# Patient Record
Sex: MALE | Race: WHITE | NOT HISPANIC OR LATINO | Employment: OTHER | URBAN - METROPOLITAN AREA
[De-identification: names, ages, dates, MRNs, and addresses within clinical notes are randomized per-mention and may not be internally consistent; named-entity substitution may affect disease eponyms.]

---

## 2018-04-15 ENCOUNTER — HOSPITAL ENCOUNTER (INPATIENT)
Facility: HOSPITAL | Age: 73
LOS: 2 days | Discharge: HOME | End: 2018-04-17
Attending: INTERNAL MEDICINE | Admitting: INTERNAL MEDICINE

## 2018-04-15 ENCOUNTER — APPOINTMENT (OUTPATIENT)
Dept: GENERAL RADIOLOGY | Facility: HOSPITAL | Age: 73
End: 2018-04-15

## 2018-04-15 DIAGNOSIS — Z74.09 IMPAIRED FUNCTIONAL MOBILITY, BALANCE, GAIT, AND ENDURANCE: Primary | ICD-10-CM

## 2018-04-15 PROBLEM — E87.1 HYPONATREMIA: Status: ACTIVE | Noted: 2018-04-15

## 2018-04-15 PROBLEM — I10 HTN (HYPERTENSION): Chronic | Status: ACTIVE | Noted: 2018-04-15

## 2018-04-15 PROBLEM — C61 PROSTATE CANCER: Chronic | Status: ACTIVE | Noted: 2018-04-15

## 2018-04-15 PROBLEM — R73.9 HYPERGLYCEMIA: Status: ACTIVE | Noted: 2018-04-15

## 2018-04-15 PROBLEM — C49.9 SARCOMA (HCC): Status: ACTIVE | Noted: 2018-04-15

## 2018-04-15 PROBLEM — A41.9 SEPSIS (HCC): Status: ACTIVE | Noted: 2018-04-15

## 2018-04-15 PROBLEM — I25.10 CAD (CORONARY ARTERY DISEASE): Chronic | Status: ACTIVE | Noted: 2018-04-15

## 2018-04-15 PROBLEM — E87.6 HYPOKALEMIA: Status: ACTIVE | Noted: 2018-04-15

## 2018-04-15 LAB
ALBUMIN SERPL-MCNC: 3.4 G/DL (ref 3.2–4.8)
ALBUMIN/GLOB SERPL: 1.4 G/DL (ref 1.5–2.5)
ALP SERPL-CCNC: 83 U/L (ref 25–100)
ALT SERPL W P-5'-P-CCNC: 11 U/L (ref 7–40)
ANION GAP SERPL CALCULATED.3IONS-SCNC: 7 MMOL/L (ref 3–11)
APTT PPP: 30.9 SECONDS (ref 24–31)
AST SERPL-CCNC: 11 U/L (ref 0–33)
BASOPHILS # BLD AUTO: 0.02 10*3/MM3 (ref 0–0.2)
BASOPHILS NFR BLD AUTO: 0.2 % (ref 0–1)
BILIRUB SERPL-MCNC: 0.8 MG/DL (ref 0.3–1.2)
BNP SERPL-MCNC: 83 PG/ML (ref 0–100)
BUN BLD-MCNC: 17 MG/DL (ref 9–23)
BUN/CREAT SERPL: 21.3 (ref 7–25)
CA-I SERPL ISE-MCNC: 1.2 MMOL/L (ref 1.12–1.32)
CALCIUM SPEC-SCNC: 8.7 MG/DL (ref 8.7–10.4)
CHLORIDE SERPL-SCNC: 98 MMOL/L (ref 99–109)
CO2 SERPL-SCNC: 25 MMOL/L (ref 20–31)
CREAT BLD-MCNC: 0.8 MG/DL (ref 0.6–1.3)
CRP SERPL-MCNC: 22.44 MG/DL (ref 0–1)
D-LACTATE SERPL-SCNC: 1.5 MMOL/L (ref 0.5–2)
DEPRECATED RDW RBC AUTO: 47.4 FL (ref 37–54)
EOSINOPHIL # BLD AUTO: 0.04 10*3/MM3 (ref 0–0.3)
EOSINOPHIL NFR BLD AUTO: 0.3 % (ref 0–3)
ERYTHROCYTE [DISTWIDTH] IN BLOOD BY AUTOMATED COUNT: 13.5 % (ref 11.3–14.5)
ERYTHROCYTE [SEDIMENTATION RATE] IN BLOOD: 64 MM/HR (ref 0–20)
GFR SERPL CREATININE-BSD FRML MDRD: 95 ML/MIN/1.73
GLOBULIN UR ELPH-MCNC: 2.4 GM/DL
GLUCOSE BLD-MCNC: 133 MG/DL (ref 70–100)
GLUCOSE BLDC GLUCOMTR-MCNC: 104 MG/DL (ref 70–130)
GLUCOSE BLDC GLUCOMTR-MCNC: 123 MG/DL (ref 70–130)
GLUCOSE BLDC GLUCOMTR-MCNC: 96 MG/DL (ref 70–130)
GLUCOSE BLDC GLUCOMTR-MCNC: 98 MG/DL (ref 70–130)
HBA1C MFR BLD: 6.6 % (ref 4.8–5.6)
HCT VFR BLD AUTO: 34 % (ref 38.9–50.9)
HGB BLD-MCNC: 11.1 G/DL (ref 13.1–17.5)
IMM GRANULOCYTES # BLD: 0.04 10*3/MM3 (ref 0–0.03)
IMM GRANULOCYTES NFR BLD: 0.3 % (ref 0–0.6)
INR PPP: 1 (ref 0.91–1.09)
LYMPHOCYTES # BLD AUTO: 0.68 10*3/MM3 (ref 0.6–4.8)
LYMPHOCYTES NFR BLD AUTO: 5.4 % (ref 24–44)
MAGNESIUM SERPL-MCNC: 1.9 MG/DL (ref 1.3–2.7)
MCH RBC QN AUTO: 31 PG (ref 27–31)
MCHC RBC AUTO-ENTMCNC: 32.6 G/DL (ref 32–36)
MCV RBC AUTO: 95 FL (ref 80–99)
MONOCYTES # BLD AUTO: 0.51 10*3/MM3 (ref 0–1)
MONOCYTES NFR BLD AUTO: 4.1 % (ref 0–12)
NEUTROPHILS # BLD AUTO: 11.29 10*3/MM3 (ref 1.5–8.3)
NEUTROPHILS NFR BLD AUTO: 89.7 % (ref 41–71)
PLAT MORPH BLD: NORMAL
PLATELET # BLD AUTO: 215 10*3/MM3 (ref 150–450)
PMV BLD AUTO: 11.9 FL (ref 6–12)
POTASSIUM BLD-SCNC: 3.1 MMOL/L (ref 3.5–5.5)
PROCALCITONIN SERPL-MCNC: 0.75 NG/ML
PROT SERPL-MCNC: 5.8 G/DL (ref 5.7–8.2)
PROTHROMBIN TIME: 10.5 SECONDS (ref 9.6–11.5)
RBC # BLD AUTO: 3.58 10*6/MM3 (ref 4.2–5.76)
RBC MORPH BLD: NORMAL
SODIUM BLD-SCNC: 130 MMOL/L (ref 132–146)
TSH SERPL DL<=0.05 MIU/L-ACNC: 2.08 MIU/ML (ref 0.35–5.35)
WBC MORPH BLD: NORMAL
WBC NRBC COR # BLD: 12.58 10*3/MM3 (ref 3.5–10.8)

## 2018-04-15 PROCEDURE — 85652 RBC SED RATE AUTOMATED: CPT | Performed by: NURSE PRACTITIONER

## 2018-04-15 PROCEDURE — 25010000002 HEPARIN (PORCINE) PER 1000 UNITS: Performed by: NURSE PRACTITIONER

## 2018-04-15 PROCEDURE — 87040 BLOOD CULTURE FOR BACTERIA: CPT | Performed by: NURSE PRACTITIONER

## 2018-04-15 PROCEDURE — 25810000003 SODIUM CHLORIDE 0.9 % WITH KCL 20 MEQ 20-0.9 MEQ/L-% SOLUTION: Performed by: NURSE PRACTITIONER

## 2018-04-15 PROCEDURE — 87186 SC STD MICRODIL/AGAR DIL: CPT | Performed by: INTERNAL MEDICINE

## 2018-04-15 PROCEDURE — 99223 1ST HOSP IP/OBS HIGH 75: CPT | Performed by: INTERNAL MEDICINE

## 2018-04-15 PROCEDURE — 71045 X-RAY EXAM CHEST 1 VIEW: CPT

## 2018-04-15 PROCEDURE — 25010000002 VANCOMYCIN PER 500 MG

## 2018-04-15 PROCEDURE — 85730 THROMBOPLASTIN TIME PARTIAL: CPT | Performed by: NURSE PRACTITIONER

## 2018-04-15 PROCEDURE — 87077 CULTURE AEROBIC IDENTIFY: CPT | Performed by: INTERNAL MEDICINE

## 2018-04-15 PROCEDURE — 84443 ASSAY THYROID STIM HORMONE: CPT | Performed by: NURSE PRACTITIONER

## 2018-04-15 PROCEDURE — 82962 GLUCOSE BLOOD TEST: CPT

## 2018-04-15 PROCEDURE — 83735 ASSAY OF MAGNESIUM: CPT | Performed by: NURSE PRACTITIONER

## 2018-04-15 PROCEDURE — 87205 SMEAR GRAM STAIN: CPT | Performed by: INTERNAL MEDICINE

## 2018-04-15 PROCEDURE — 83880 ASSAY OF NATRIURETIC PEPTIDE: CPT | Performed by: NURSE PRACTITIONER

## 2018-04-15 PROCEDURE — 87147 CULTURE TYPE IMMUNOLOGIC: CPT | Performed by: INTERNAL MEDICINE

## 2018-04-15 PROCEDURE — 80053 COMPREHEN METABOLIC PANEL: CPT | Performed by: NURSE PRACTITIONER

## 2018-04-15 PROCEDURE — 83036 HEMOGLOBIN GLYCOSYLATED A1C: CPT | Performed by: NURSE PRACTITIONER

## 2018-04-15 PROCEDURE — 85007 BL SMEAR W/DIFF WBC COUNT: CPT | Performed by: NURSE PRACTITIONER

## 2018-04-15 PROCEDURE — 85610 PROTHROMBIN TIME: CPT | Performed by: NURSE PRACTITIONER

## 2018-04-15 PROCEDURE — 86140 C-REACTIVE PROTEIN: CPT | Performed by: NURSE PRACTITIONER

## 2018-04-15 PROCEDURE — 85025 COMPLETE CBC W/AUTO DIFF WBC: CPT | Performed by: NURSE PRACTITIONER

## 2018-04-15 PROCEDURE — 25010000002 CEFTRIAXONE PER 250 MG: Performed by: PHYSICIAN ASSISTANT

## 2018-04-15 PROCEDURE — 63710000001 INSULIN LISPRO (HUMAN) PER 5 UNITS: Performed by: NURSE PRACTITIONER

## 2018-04-15 PROCEDURE — 97161 PT EVAL LOW COMPLEX 20 MIN: CPT

## 2018-04-15 PROCEDURE — 83605 ASSAY OF LACTIC ACID: CPT | Performed by: NURSE PRACTITIONER

## 2018-04-15 PROCEDURE — 87070 CULTURE OTHR SPECIMN AEROBIC: CPT | Performed by: INTERNAL MEDICINE

## 2018-04-15 PROCEDURE — 25010000002 VANCOMYCIN

## 2018-04-15 PROCEDURE — 82330 ASSAY OF CALCIUM: CPT | Performed by: NURSE PRACTITIONER

## 2018-04-15 PROCEDURE — 84145 PROCALCITONIN (PCT): CPT | Performed by: NURSE PRACTITIONER

## 2018-04-15 RX ORDER — ASPIRIN 81 MG/1
81 TABLET ORAL DAILY
Status: DISCONTINUED | OUTPATIENT
Start: 2018-04-15 | End: 2018-04-17 | Stop reason: HOSPADM

## 2018-04-15 RX ORDER — MAGNESIUM SULFATE HEPTAHYDRATE 40 MG/ML
2 INJECTION, SOLUTION INTRAVENOUS AS NEEDED
Status: DISCONTINUED | OUTPATIENT
Start: 2018-04-15 | End: 2018-04-17 | Stop reason: HOSPADM

## 2018-04-15 RX ORDER — HYDROCODONE BITARTRATE AND ACETAMINOPHEN 7.5; 325 MG/1; MG/1
1 TABLET ORAL EVERY 6 HOURS PRN
Status: DISCONTINUED | OUTPATIENT
Start: 2018-04-15 | End: 2018-04-17 | Stop reason: HOSPADM

## 2018-04-15 RX ORDER — DOCUSATE SODIUM 100 MG/1
100 CAPSULE, LIQUID FILLED ORAL 2 TIMES DAILY PRN
Status: DISCONTINUED | OUTPATIENT
Start: 2018-04-15 | End: 2018-04-17 | Stop reason: HOSPADM

## 2018-04-15 RX ORDER — PROMETHAZINE HYDROCHLORIDE 25 MG/ML
12.5 INJECTION, SOLUTION INTRAMUSCULAR; INTRAVENOUS EVERY 6 HOURS PRN
Status: DISCONTINUED | OUTPATIENT
Start: 2018-04-15 | End: 2018-04-17 | Stop reason: HOSPADM

## 2018-04-15 RX ORDER — POTASSIUM CHLORIDE 1.5 G/1.77G
40 POWDER, FOR SOLUTION ORAL AS NEEDED
Status: DISCONTINUED | OUTPATIENT
Start: 2018-04-15 | End: 2018-04-17 | Stop reason: HOSPADM

## 2018-04-15 RX ORDER — FLUTICASONE PROPIONATE 50 MCG
2 SPRAY, SUSPENSION (ML) NASAL DAILY
COMMUNITY

## 2018-04-15 RX ORDER — FAMOTIDINE 20 MG/1
20 TABLET, FILM COATED ORAL 2 TIMES DAILY PRN
Status: DISCONTINUED | OUTPATIENT
Start: 2018-04-15 | End: 2018-04-17 | Stop reason: HOSPADM

## 2018-04-15 RX ORDER — MAGNESIUM SULFATE HEPTAHYDRATE 40 MG/ML
4 INJECTION, SOLUTION INTRAVENOUS AS NEEDED
Status: DISCONTINUED | OUTPATIENT
Start: 2018-04-15 | End: 2018-04-17 | Stop reason: HOSPADM

## 2018-04-15 RX ORDER — HEPARIN SODIUM 5000 [USP'U]/ML
5000 INJECTION, SOLUTION INTRAVENOUS; SUBCUTANEOUS EVERY 12 HOURS SCHEDULED
Status: DISCONTINUED | OUTPATIENT
Start: 2018-04-15 | End: 2018-04-17 | Stop reason: HOSPADM

## 2018-04-15 RX ORDER — L.ACID,PARA/B.BIFIDUM/S.THERM 8B CELL
1 CAPSULE ORAL DAILY
Status: DISCONTINUED | OUTPATIENT
Start: 2018-04-15 | End: 2018-04-17 | Stop reason: HOSPADM

## 2018-04-15 RX ORDER — SODIUM CHLORIDE 0.9 % (FLUSH) 0.9 %
1-10 SYRINGE (ML) INJECTION AS NEEDED
Status: DISCONTINUED | OUTPATIENT
Start: 2018-04-15 | End: 2018-04-17 | Stop reason: HOSPADM

## 2018-04-15 RX ORDER — ABIRATERONE ACETATE 250 MG/1
1000 TABLET ORAL NIGHTLY
COMMUNITY
End: 2020-01-29

## 2018-04-15 RX ORDER — ATORVASTATIN CALCIUM 20 MG/1
20 TABLET, FILM COATED ORAL NIGHTLY
Status: DISCONTINUED | OUTPATIENT
Start: 2018-04-15 | End: 2018-04-17 | Stop reason: HOSPADM

## 2018-04-15 RX ORDER — PREDNISONE 1 MG/1
5 TABLET ORAL 2 TIMES DAILY
COMMUNITY
End: 2020-01-29

## 2018-04-15 RX ORDER — CEFTRIAXONE SODIUM 1 G/50ML
1 INJECTION, SOLUTION INTRAVENOUS EVERY 24 HOURS
Status: DISCONTINUED | OUTPATIENT
Start: 2018-04-15 | End: 2018-04-16

## 2018-04-15 RX ORDER — ONDANSETRON 4 MG/1
4 TABLET, FILM COATED ORAL EVERY 6 HOURS PRN
Status: DISCONTINUED | OUTPATIENT
Start: 2018-04-15 | End: 2018-04-17 | Stop reason: HOSPADM

## 2018-04-15 RX ORDER — POTASSIUM CHLORIDE 750 MG/1
40 CAPSULE, EXTENDED RELEASE ORAL AS NEEDED
Status: DISCONTINUED | OUTPATIENT
Start: 2018-04-15 | End: 2018-04-17 | Stop reason: HOSPADM

## 2018-04-15 RX ORDER — DEXTROSE MONOHYDRATE 25 G/50ML
25 INJECTION, SOLUTION INTRAVENOUS
Status: DISCONTINUED | OUTPATIENT
Start: 2018-04-15 | End: 2018-04-16

## 2018-04-15 RX ORDER — ASPIRIN 81 MG/1
81 TABLET ORAL EVERY MORNING
COMMUNITY

## 2018-04-15 RX ORDER — ONDANSETRON 2 MG/ML
4 INJECTION INTRAMUSCULAR; INTRAVENOUS EVERY 6 HOURS PRN
Status: DISCONTINUED | OUTPATIENT
Start: 2018-04-15 | End: 2018-04-17 | Stop reason: HOSPADM

## 2018-04-15 RX ORDER — FLUTICASONE PROPIONATE 50 MCG
2 SPRAY, SUSPENSION (ML) NASAL DAILY
Status: DISCONTINUED | OUTPATIENT
Start: 2018-04-15 | End: 2018-04-17 | Stop reason: HOSPADM

## 2018-04-15 RX ORDER — ACETAMINOPHEN 325 MG/1
650 TABLET ORAL EVERY 4 HOURS PRN
Status: DISCONTINUED | OUTPATIENT
Start: 2018-04-15 | End: 2018-04-17 | Stop reason: HOSPADM

## 2018-04-15 RX ORDER — NICOTINE POLACRILEX 4 MG
15 LOZENGE BUCCAL
Status: DISCONTINUED | OUTPATIENT
Start: 2018-04-15 | End: 2018-04-16

## 2018-04-15 RX ORDER — TIMOLOL MALEATE 5 MG/ML
1 SOLUTION/ DROPS OPHTHALMIC DAILY
Status: DISCONTINUED | OUTPATIENT
Start: 2018-04-15 | End: 2018-04-17 | Stop reason: HOSPADM

## 2018-04-15 RX ORDER — TIMOLOL 5 MG/ML
SOLUTION/ DROPS OPHTHALMIC EVERY MORNING
COMMUNITY
End: 2018-05-02 | Stop reason: SDUPTHER

## 2018-04-15 RX ORDER — SODIUM CHLORIDE AND POTASSIUM CHLORIDE 150; 900 MG/100ML; MG/100ML
75 INJECTION, SOLUTION INTRAVENOUS CONTINUOUS
Status: DISCONTINUED | OUTPATIENT
Start: 2018-04-15 | End: 2018-04-16

## 2018-04-15 RX ORDER — SIMVASTATIN 40 MG
40 TABLET ORAL NIGHTLY
COMMUNITY

## 2018-04-15 RX ORDER — LANSOPRAZOLE 30 MG/1
30 CAPSULE, DELAYED RELEASE ORAL EVERY MORNING
COMMUNITY

## 2018-04-15 RX ORDER — VANCOMYCIN 1.75 GRAM/500 ML IN 0.9 % SODIUM CHLORIDE INTRAVENOUS
20 EVERY 12 HOURS
Status: DISCONTINUED | OUTPATIENT
Start: 2018-04-15 | End: 2018-04-15 | Stop reason: ALTCHOICE

## 2018-04-15 RX ORDER — BISACODYL 5 MG/1
5 TABLET, DELAYED RELEASE ORAL DAILY PRN
Status: DISCONTINUED | OUTPATIENT
Start: 2018-04-15 | End: 2018-04-17 | Stop reason: HOSPADM

## 2018-04-15 RX ORDER — PANTOPRAZOLE SODIUM 40 MG/1
40 TABLET, DELAYED RELEASE ORAL
Status: DISCONTINUED | OUTPATIENT
Start: 2018-04-15 | End: 2018-04-17 | Stop reason: HOSPADM

## 2018-04-15 RX ADMIN — FLUTICASONE PROPIONATE 2 SPRAY: 50 SPRAY, METERED NASAL at 08:57

## 2018-04-15 RX ADMIN — VANCOMYCIN HYDROCHLORIDE 1250 MG: 10 INJECTION, POWDER, LYOPHILIZED, FOR SOLUTION INTRAVENOUS at 17:12

## 2018-04-15 RX ADMIN — ATORVASTATIN CALCIUM 20 MG: 20 TABLET, FILM COATED ORAL at 20:44

## 2018-04-15 RX ADMIN — AZTREONAM 1 G: 1 INJECTION, POWDER, LYOPHILIZED, FOR SOLUTION INTRAMUSCULAR; INTRAVENOUS at 06:40

## 2018-04-15 RX ADMIN — SODIUM CHLORIDE 1000 ML: 9 INJECTION, SOLUTION INTRAVENOUS at 04:51

## 2018-04-15 RX ADMIN — VANCOMYCIN HYDROCHLORIDE 750 MG: 750 INJECTION, SOLUTION INTRAVENOUS at 06:00

## 2018-04-15 RX ADMIN — Medication 1 CAPSULE: at 08:55

## 2018-04-15 RX ADMIN — ASPIRIN 81 MG: 81 TABLET, COATED ORAL at 08:55

## 2018-04-15 RX ADMIN — HEPARIN SODIUM 5000 UNITS: 5000 INJECTION, SOLUTION INTRAVENOUS; SUBCUTANEOUS at 08:55

## 2018-04-15 RX ADMIN — MUPIROCIN: 20 OINTMENT TOPICAL at 20:44

## 2018-04-15 RX ADMIN — CEFTRIAXONE SODIUM 1 G: 1 INJECTION, SOLUTION INTRAVENOUS at 14:14

## 2018-04-15 RX ADMIN — HEPARIN SODIUM 5000 UNITS: 5000 INJECTION, SOLUTION INTRAVENOUS; SUBCUTANEOUS at 20:44

## 2018-04-15 RX ADMIN — TIMOLOL MALEATE 1 DROP: 5 SOLUTION/ DROPS OPHTHALMIC at 08:55

## 2018-04-15 RX ADMIN — POTASSIUM CHLORIDE AND SODIUM CHLORIDE 75 ML/HR: 900; 150 INJECTION, SOLUTION INTRAVENOUS at 05:59

## 2018-04-15 RX ADMIN — MUPIROCIN: 20 OINTMENT TOPICAL at 17:12

## 2018-04-15 NOTE — PLAN OF CARE
Problem: Fall Risk (Adult)  Intervention: Monitor/Assist with Self Care   04/15/18 0313   Activity   Activity Assistance Provided assistance, 1 person     Intervention: Reduce Risk/Promote Restraint Free Environment   04/15/18 0313   Safety Management   Environmental Safety Modification assistive device/personal items within reach;clutter free environment maintained;lighting adjusted;room organization consistent   Safety Promotion/Fall Prevention activity supervised;nonskid shoes/slippers when out of bed;safety round/check completed       Goal: Identify Related Risk Factors and Signs and Symptoms  Outcome: Ongoing (interventions implemented as appropriate)   04/15/18 0342   Fall Risk (Adult)   Related Risk Factors (Fall Risk) age-related changes;gait/mobility problems;impaired vision     Goal: Absence of Fall   04/15/18 0342   Fall Risk (Adult)   Absence of Fall making progress toward outcome

## 2018-04-15 NOTE — H&P
[facility] Medicine Services  HISTORY AND PHYSICAL    Patient Name: Dusty Manzanares  : 1945  MRN: [id number]  Primary Care Physician: Shalom Holt MD   Dermatology: Dr. Khoi Hameed  Oncology: Dr. Robbin Gavin  Urology: Dr. Karthik Davila  Cardiology: Dr. Fregoso    Subjective   Subjective     Chief Complaint:  Fever     HPI:  Dusty Manzanares is a 73 y.o. male who presented to [facility] for a collection of complaints to include fever. Onset 18 just before arrival to ED, and factor that led them to seek more care. Constant. Moderate. Associated with weakness, N/V, weakness, and decreased PO that started .  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 Hameed at outpatient [facility]. He is also c/o right facial swelling and erythema. Notable labs included WBC 13.3, potassium 2.8, sodium 129, and lactic acid 3.3. He has been started on aztreonam, vanc, potassium, tylenol, zofran, and has received 2L bolus NS PTA for sepsis. He has been transferred to [facility] for higher level of care.    He has a pending appt with radiation oncology Dr. 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    Review of Systems   Constitutional: Positive for activity change, chills and fever. Negative for appetite change and diaphoresis.        Does not know if has lost any wt with cancer, ~175 lbs baseline. Baseline no DME, now can barely walk and stumbling. T >101 PTA to OSH.   HENT: Positive for facial swelling and hearing loss. Negative for congestion, dental problem, ear pain, mouth sores, nosebleeds, postnasal drip, rhinorrhea, sinus pain, sinus pressure, sore throat and trouble swallowing.    Eyes: Positive for photophobia. Negative for pain, discharge, redness, itching and visual disturbance.        Acute photophobia. States right eyelids started to swell a lot, then left lesser.   Respiratory: Negative for  apnea, cough, shortness of breath and wheezing.         States hypoxic sat at 88% at OSH was felt to be a sensor error/positioning and not actual hypoxia, data deficient, per family.   Cardiovascular: Negative for chest pain, palpitations and leg swelling.   Gastrointestinal: Positive for constipation, diarrhea, nausea and vomiting. Negative for abdominal distention, abdominal pain and blood in stool.        Chronic constipation. 4 vomiting episodes since onset. Diarrhea 4x yesterday but resolved.   Endocrine:        No known DM or IGT, does not check, on steroids.   Genitourinary: Negative for difficulty urinating, dysuria and hematuria.   Musculoskeletal: Negative for myalgias.        Denies any pain.   Skin: Positive for color change. Negative for pallor and rash.        Color change to right cheek 4/15. Surgical wound to scalp 4/11, closed 4/12 - denies irritation to area. Checked and original dressing changed at OSH. Arms baseline changes on prednisone.   Allergic/Immunologic: Positive for environmental allergies.        States allergies not bothersome at this time.   Neurological: Positive for weakness. Negative for dizziness, syncope, facial asymmetry and headaches.   Hematological: Negative for adenopathy. Bruises/bleeds easily.   Psychiatric/Behavioral: Negative for confusion and hallucinations.        Denies memory loss.        Otherwise 10-system ROS reviewed and is negative except as mentioned in the HPI.    Personal History     Past Medical History:   Diagnosis Date   • Basal cell carcinoma    • CAD (coronary artery disease) 4/15/2018   • HTN (hypertension) 4/15/2018   • Kidney stones    • Myocardial infarction     2010, CABG x5   • Prostate CA     had spread outside of prostate when found, radiation to area only. on oral zytiga. still present. no surgical intervention.   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   • Sepsis 4/15/2018       Past Surgical History:   Procedure Laterality Date   •  CARDIAC CATHETERIZATION      2010; s/p CABG after   • CORONARY ARTERY BYPASS GRAFT      x5   • ENDOSCOPY      2006, dr burroughs, for dysphagia, pathology in chart   • KIDNEY STONE SURGERY     • TRIGGER FINGER RELEASE      x2, one on each hand, pinky fingers       Family History: family history includes Heart attack in his father; No Known Problems in his son; Stroke in his mother and sister.     Social History:  reports that he has never smoked. He has never used smokeless tobacco. He reports that he does not drink alcohol or use drugs.  Social History     Social History Narrative    Mr. Manzanares is a 73 year old white male. They live in [place]. He has two sons. He is retired from the [employer]. He does not have an advanced directive.       Medications:  Prescriptions Prior to Admission   Medication Sig Dispense Refill Last Dose   • abiraterone (ZYTIGA) 250 MG chemo tablet Take 1,000 mg by mouth Every Night.      • aspirin 81 MG EC tablet Take 81 mg by mouth Every Morning.      • fluticasone (FLONASE) 50 MCG/ACT nasal spray 2 sprays into each nostril Daily.      • lansoprazole (PREVACID) 30 MG capsule Take 30 mg by mouth Every Morning.      • losartan 50 MG tablet 2 tablet, hydrochlorothiazide 25 MG tablet 1 tablet Take  by mouth Every Morning.      • predniSONE (DELTASONE) 5 MG tablet Take 5 mg by mouth 2 (Two) Times a Day.      • simvastatin (ZOCOR) 40 MG tablet Take 40 mg by mouth Every Night.      • Timolol Maleate PF 0.5 % solution Apply  to eye Every Morning.          Allergies   Allergen Reactions   • Augmentin [Amoxicillin-Pot Clavulanate] Hives   • Sulfa Antibiotics Rash     Red like a sunburn all over       Objective   Objective     Vital Signs:   Temp:  [98 °F (36.7 °C)] 98 °F (36.7 °C)  Heart Rate:  [113] 113  Resp:  [18] 18  BP: (128)/(78-85) 128/78        Physical Exam   Constitutional: He appears well-developed and well-nourished. No distress.   Overweight. Wife and two adult sons present,  supportive, attentive to pt and visit.   HENT:   Head: Normocephalic and atraumatic.   Mouth/Throat: No oropharyngeal exudate.   Hearing aide. Tongue dry with thin coat.   Eyes: EOM are normal. Pupils are equal, round, and reactive to light. Right eye exhibits discharge. Left eye exhibits no discharge.   Difficult assessment r/t photophobia. PEERL. Scant clear discharge from lateral right eye. Conjunctivae is not clear white, slight pale yellow.   Neck: No JVD present. No tracheal deviation present.   Cardiovascular: Regular rhythm, normal heart sounds and intact distal pulses.  Tachycardia present.    No murmur heard.  Pulses:       Radial pulses are 2+ on the right side, and 2+ on the left side.        Dorsalis pedis pulses are 2+ on the right side, and 2+ on the left side.   No edema. Firm inferior periorbital edema moderate-severe, rest of periorbital mild and not firm.   Pulmonary/Chest: Effort normal and breath sounds normal. No respiratory distress. He has no wheezes. He has no rales. He exhibits no tenderness.   Abdominal: Soft. Bowel sounds are normal. He exhibits no distension. There is no tenderness. There is no guarding.   Genitourinary:   Genitourinary Comments: Bladder non-tender.   Musculoskeletal: He exhibits no edema or deformity.   Neurological: No cranial nerve deficit.   Aroused to touch, alert. Oriented x4.   Skin: Skin is warm and dry. No rash noted. He is not diaphoretic. There is erythema. No pallor.   Erythema to right cheek.   Psychiatric:   Flat, quiet, cooperative, answers questions.        Results Reviewed:  I have personally reviewed current lab, radiology, and data and agree.    OSH records notable for:  - Vitals T 101.5, -130s, SBP , DBP 50-58. RR 18-36, pulse ox 88-93% RA.  - Lactic acid 3.1.  -Kos 199, , creatinine 1.33, AST 16, MALT 18, alkaline phosphatase 110, total bili 1.0, total protein 7.8, albumin 2.8, calcium 10.0, sodium 129, potassium 2.8, chloride 91,  CO2 26, anion gap 15, osmolality calculated 268.6, globulin 5.0, estimated GFR 53.  -Troponin 0.03(they're gray zone range is considered 0.08-0.50).  - Magnesium 1.8.  -Urinalysis: Large, clear, specific gravity greater than 1.030, pH 5.5, leuk esterase negative, nitrite negative, protein 1+, negative for glucose and ketones, urobilinogen 1.0, bilirubin 1+, occult blood negative.  -CBC 13.3, RBC 4.45, hemoglobin 13.8, hematocrit 40.6, MCV 91, MCH 31.0, MCHC 34.0, RDW 13.2, platelet 269, MPV 11.9, increased percentage and absolute neutrophils 88.4/11.8.-EG sinus tachycardia with premature atrial complexes.  Ventricular rate 131, CO interval 148, QRS duration 96, QT/QTc 300/443.            Invalid input(s):  ALKPHOS, TROPONININT  CrCl cannot be calculated (No order found.).  Brief Urine Lab Results     None        No results found for: BNP  No results found for: PHART  Imaging Results (last 24 hours)     ** No results found for the last 24 hours. **             Assessment/Plan   Assessment / Plan     Hospital Problem List     * (Principal)Sepsis    HTN (hypertension) (Chronic)    CAD (coronary artery disease) (Chronic)    Sarcoma    Prostate cancer (Chronic)            Assessment & Plan:    1. Meets sepsis criteria with at least facial cellulitis infection (consider SIRS for other possible sources), tachycardia, tachypnea, hypoxia, hypotension, hyperglycemia but on steroids and could have underlying DM, leukocytosis while immunosuppressed but on steroid to go with zytiga for prostate cancer. Additional diagnostics. Consider imaging. Will repeat blood culture here to keep closer f/u, BUT MUST F/U ON BLOOD CULTURE FROM OSH [facility]. Urine neg. Check CXR.    2. ED provider at OSH removed and replaced original surgery dressing to scalp to evaluate and reported no drainage/bleeding or suspected infection. Excision 4/11, closure 4/12, outpatient by dermatology Dr. Hameed. Also has a hx of 2 basal cell  carcinoma (one under right eye, and other left cheek and had plastic surgery by Dr Zachary Bentley on it also) in 2017 by same provider.    3. Prostate cancer dx 2009. Reports had spread by time dx and received radiation to area (no seeds), and has been on the oral chemo and steroid since. Unclear on further staging or details. Is full code / full support and does not appear to have broached this subject prior, may need to f/u on GOC and plans.    4. CAD s/p CABG x5.    5. Hold HCTZ r/t dehydration/hypotension, unclear renal baseline but Cr 1.3 at OSH. Hold ARB for now.    6. Hold diuretics as above. NS with potassium IV hydration for now. Replace potassium and magnesium per protocols. Consider underlying DM vs steroids for hyperglycemia - check A1C and use SSI as needed.    DVT prophylaxis: Teds/scuds, heparin SQ.    CODE STATUS:  Full code / full support. Has capacity. Wife is next of kin. No advanced directives.     Admission Status:  I believe this patient meets INPATIENT status due to the need for care which can only be reasonably provided in an hospital setting such as aggressive/expedited ancillary services and/or consultation services, the necessity for IV medications, close physician monitoring and/or the possible need for procedures.  In such, I feel patient’s risk for adverse outcomes and need for care warrant INPATIENT evaluation and predict the patient’s care encounter to likely last beyond 2 midnights.      Electronically signed by AUDREY Hopkins, 04/15/18, 2:54 AM.    PHYSICIAN NOTE(ADDENDUM)           HPI         Vitals:     04/15/18 0154   BP: 128/78   Pulse: 113    Resp: 18    Temp: 127/80       Transfer form [place] ED for sepsis management.  Presented to ED with the chief complaint of nausea vomiting, decreased appetite-Had episode of loose BM yesterday 4 times but today it has resolved.  Last 12-24 hours he started developing fever.  Patient had excision of sarcoma over the scalp  approximately 5 days back by Dr. Arias, followed by the closure of wound next day.  Patient did not complain of any worsening pain or discharge through that incision.  But he started developing erythema on the right side of the face along with swelling on the eyelid.  Do not complain of any pain with the movement of the eyes.  Cough/dyspnea-none  Dysuria-none  No complain of abdominal cramping/runny nose/neck pain.  At the external ER he was found to be septic with hypotension, tachycardia, some degree of hypoxia elevated lactic acid of 3.3.  Chest x-ray reviewed with the ER attending which was negative for any acute process.    On exam significant erythema on the right face tender to touch but no obvious fluid fluctuation noted, along with edema of the eyelid.  Lungs clear to auscultation bilaterally, abdomen soft nontender bowel sounds positive.           Old records reviewed and summarized in PM hx.  He got aztreonam, vancomycin in ED.  PM hx  GERD-Nexium  Prostate CA- zytiga 250 mg daily/status post radiation  Hypertension-losartan 50 mg by mouth every 12, at city is a 25 daily  Hyperlipidemia-Zocor  Aspirin 81-CAD.  Status post CABG  Prednisone 5 mg/Flonase  Sarcoma of the skull-with recent excision.     A/P  Sepsis-likely source being skin on the face, questionable recent surgical wound  -Patient started on zosyn and vancomycin from the external ER will continue that  -Follow-up the blood culture from the external ER  -UA negative  -Does not appear to be a abdominal pathology but continue to monitor for any diarrhea symptoms.  -ID consult  -Rest assessment and plan as per NP's note.        I have independently seen and examined the patient with ARNP and the note above reflects my changes and contributions. I have discussed with findings, diagnosis and plans with the patient and family.      Luiz Martinez MD 04/15/18 3:22 AM

## 2018-04-15 NOTE — THERAPY EVALUATION
Acute Care - Physical Therapy Initial Evaluation  [facility]     Patient Name: Dusty Manzanares  : 1945  MRN: [id number]  Today's Date: 4/15/2018   Onset of Illness/Injury or Date of Surgery: 04/15/18  Date of Referral to PT: 04/15/18  Referring Physician: Tanvi VENTURA      Admit Date: 4/15/2018    Visit Dx:     ICD-10-CM ICD-9-CM   1. Impaired functional mobility, balance, gait, and endurance Z74.09 V49.89     Patient Active Problem List   Diagnosis   • HTN (hypertension)   • CAD (coronary artery disease)   • Sepsis   • Sarcoma   • Prostate cancer   • Hyperglycemia   • Hyponatremia   • Hypokalemia     Past Medical History:   Diagnosis Date   • Basal cell carcinoma    • CAD (coronary artery disease) 4/15/2018   • HTN (hypertension) 4/15/2018   • Hyperglycemia 4/15/2018   • Hypokalemia 4/15/2018   • Hyponatremia 4/15/2018   • Kidney stones    • Myocardial infarction     2010, CABG x5   • Prostate CA     had spread outside of prostate when found, radiation to area only. on oral zytiga. still present. no surgical intervention.   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   • Sepsis 4/15/2018     Past Surgical History:   Procedure Laterality Date   • CARDIAC CATHETERIZATION      2010; s/p CABG after   • CORONARY ARTERY BYPASS GRAFT      x5   • ENDOSCOPY      , dr burroughs, for dysphagia, pathology in chart   • KIDNEY STONE SURGERY     • TRIGGER FINGER RELEASE      x2, one on each hand, pinky fingers        PT ASSESSMENT (last 12 hours)      Physical Therapy Evaluation     Row Name 04/15/18 1350          PT Evaluation Time/Intention    Subjective Information no complaints  -LF     Document Type evaluation  -LF     Mode of Treatment physical therapy  -LF     Patient Effort good  -LF     Symptoms Noted During/After Treatment none  -LF     Row Name 04/15/18 1350          General Information    Patient Profile Reviewed? yes  -LF     Onset of Illness/Injury or Date of Surgery 04/15/18  -LF     Referring  Physician Tanvi VENTURA  -LF     Patient Observations alert;cooperative;agree to therapy  -LF     Patient/Family Observations family x2 in room w/ patient  -LF     Prior Level of Function independent:;all household mobility;community mobility  -LF     Equipment Currently Used at Home none   has cane and walker  -LF     Pertinent History of Current Functional Problem To ED with N&V, decreased appetite, fever.  had excision of sarcoma 5 days ago, with wound closure the following day.  Dx sepsis  -LF     Existing Precautions/Restrictions no known precautions/restrictions  -LF     Limitations/Impairments hearing  -LF     Risks Reviewed patient and family:;nausea/vomiting;dizziness;increased discomfort;lines disloged  -LF     Benefits Reviewed patient and family:;improve function;increase independence  -LF     Barriers to Rehab none identified  -LF     Row Name 04/15/18 1350          Relationship/Environment    Primary Source of Support/Comfort spouse  -LF     Lives With spouse  -LF     Row Name 04/15/18 1350          Resource/Environmental Concerns    Current Living Arrangements home/apartment/condo  -     Row Name 04/15/18 1350          Cognitive Assessment/Intervention- PT/OT    Orientation Status (Cognition) oriented x 4  -LF     Follows Commands (Cognition) WFL  -LF     Row Name 04/15/18 1350          Bed Mobility Assessment/Treatment    Bed Mobility Assessment/Treatment supine-sit;sit-supine  -LF     Supine-Sit Calvin (Bed Mobility) minimum assist (75% patient effort)  -LF     Sit-Supine [place] (Bed Mobility) supervision  -     Assistive Device (Bed Mobility) bed rails  -     Row Name 04/15/18 1350          Transfer Assessment/Treatment    Transfer Assessment/Treatment sit-stand transfer;stand-sit transfer  -LF     Sit-Stand Calvin (Transfers) minimum assist (75% patient effort)  -LF     Stand-Sit Calvin (Transfers) contact guard  -     Row Name 04/15/18 1350          Gait/Stairs  Assessment/Training    Gait/Stairs Assessment/Training gait/ambulation independence;gait/ambulation assistive device;distance ambulated;other (see comments)  -     [place] Level (Gait) contact guard  -     Distance in Feet (Gait) 350  -     Comment (Gait/Stairs) ambulated 50', then remainder of distance without A.D.  No LOB, normal jadyn   Patient stated PTA he had become weak, unsteady, staggering  -LF     Row Name 04/15/18 1350          General ROM    GENERAL ROM COMMENTS BLE ROM WNL's  -LF     Row Name 04/15/18 1350          MMT (Manual Muscle Testing)    Additional Documentation General Assessment (Manual Muscle Testing) (Group)  -LF     Row Name 04/15/18 1350          General Assessment (Manual Muscle Testing)    General Manual Muscle Testing (MMT) Assessment lower extremity strength deficits identified  -     Comment, General Manual Muscle Testing (MMT) Assessment 4/5 hip and knee, 4+/5 ankle bilaterally  -LF     Row Name 04/15/18 1350          Motor Assessment/Intervention    Additional Documentation Balance (Group)  -LF     Row Name 04/15/18 1350          Balance    Balance static sitting balance;static standing balance  -LF     Row Name 04/15/18 1350          Static Sitting Balance    Level of [place] (Supported Sitting, Static Balance) independent  -     Sitting Position (Supported Sitting, Static Balance) sitting on edge of bed  -LF     Row Name 04/15/18 1350          Static Standing Balance    Level of [place] (Supported Standing, Static Balance) independent  -LF     Row Name 04/15/18 1350          Pain Assessment    Additional Documentation Pain Scale: Numbers Pre/Post-Treatment (Group)  -LF     Row Name 04/15/18 1350          Pain Scale: Numbers Pre/Post-Treatment    Pain Scale: Numbers, Pretreatment 0/10 - no pain  -     Pain Scale: Numbers, Post-Treatment 0/10 - no pain  -LF     Row Name 04/15/18 1350          Physical Therapy Clinical Impression    Date of Referral to PT  04/15/18  -LF     PT Diagnosis (PT Clinical Impression) impaired functional mobility  -LF     Patient/Family Goals Statement (PT Clinical Impression) return to prior level  -LF     Criteria for Skilled Interventions Met (PT Clinical Impression) yes;treatment indicated  -LF     Rehab Potential (PT Clinical Summary) good, to achieve stated therapy goals  -LF     Care Plan Review (PT) evaluation/treatment results reviewed;patient/other agree to care plan  -LF     Care Plan Review, Other Participant (PT Clinical Impression) family  -LF     Row Name 04/15/18 0115          Physical Therapy Goals    Bed Mobility Goal Selection (PT) bed mobility, PT goal 1  -LF     Transfer Goal Selection (PT) transfer, PT goal 1  -LF     Gait Training Goal Selection (PT) gait training, PT goal 1  -LF     Row Name 04/15/18 3993          Bed Mobility Goal 1 (PT)    Activity/Assistive Device (Bed Mobility Goal 1, PT) bed mobility activities, all  -LF     [place] Level/Cues Needed (Bed Mobility Goal 1, PT) independent  -LF     Time Frame (Bed Mobility Goal 1, PT) 5 days  -LF     Row Name 04/15/18 3040          Transfer Goal 1 (PT)    Activity/Assistive Device (Transfer Goal 1, PT) sit-to-stand/stand-to-sit;bed-to-chair/chair-to-bed  -LF     [place] Level/Cues Needed (Transfer Goal 1, PT) independent  -LF     Time Frame (Transfer Goal 1, PT) 5 days  -LF     Row Name 04/15/18 9805          Gait Training Goal 1 (PT)    Activity/Assistive Device (Gait Training Goal 1, PT) gait (walking locomotion)  -LF     [place] Level (Gait Training Goal 1, PT) independent  -LF     Distance (Gait Goal 1, PT) 700  -LF     Time Frame (Gait Training Goal 1, PT) 5 days  -LF     Row Name 04/15/18 4243          Positioning and Restraints    Pre-Treatment Position in bed  -LF     Post Treatment Position bed  -LF     In Bed supine;notified nsg;call light within reach;encouraged to call for assist;with family/caregiver  -LF       User Key  (r) = Recorded  By, (t) = Taken By, (c) = Cosigned By    Initials Name Provider Type     Dipti England PT Physical Therapist          Physical Therapy Education     Title: PT OT SLP Therapies (Done)     Topic: Physical Therapy (Done)     Point: Mobility training (Done)    Learning Progress Summary     Learner Status Readiness Method Response Comment Documented by    Patient Done Acceptance E VU discussed walking with family/nursing in [place]  04/15/18 1414          Point: Home exercise program (Done)    Learning Progress Summary     Learner Status Readiness Method Response Comment Documented by    Patient Done Acceptance E VU discussed walking with family/nursing in [place]  04/15/18 1414          Point: Body mechanics (Done)    Learning Progress Summary     Learner Status Readiness Method Response Comment Documented by    Patient Done Acceptance E VU discussed walking with family/nursing in [place]  04/15/18 1414          Point: Precautions (Done)    Learning Progress Summary     Learner Status Readiness Method Response Comment Documented by    Patient Done Acceptance E VU discussed walking with family/nursing in [place]  04/15/18 1414                      User Key     Initials Effective Dates Name Provider Type Critical access hospital 06/19/15 -  Dipti England PT Physical Therapist PT                PT Recommendation and Plan  Anticipated Discharge Disposition (PT): home or self care  Planned Therapy Interventions (PT Eval): balance training, bed mobility training, gait training, home exercise program, patient/family education, strengthening  Therapy Frequency (PT Clinical Impression): daily  Outcome Summary/Treatment Plan (PT)  Anticipated Discharge Disposition (PT): home or self care  Plan of Care Reviewed With: patient  Progress: improving  Outcome Summary: PT eval complete.  Sandra supine>sit, and ambulated ' w/o LOB. Will follow-up to maximize functional mobility toward PLOF          Outcome Measures     Row Name  04/15/18 1350             How much help from another person do you currently need...    Turning from your back to your side while in flat bed without using bedrails? 4  -LF      Moving from lying on back to sitting on the side of a flat bed without bedrails? 3  -LF      Moving to and from a bed to a chair (including a wheelchair)? 4  -LF      Standing up from a chair using your arms (e.g., wheelchair, bedside chair)? 3  -LF      Climbing 3-5 steps with a railing? 3  -LF      To walk in hospital room? 4  -LF      AM-PAC 6 Clicks Score 21  -LF         Functional Assessment    Outcome Measure Options AM-PAC 6 Clicks Basic Mobility (PT)  -LF        User Key  (r) = Recorded By, (t) = Taken By, (c) = Cosigned By    Initials Name Provider Type     Dipti England PT Physical Therapist           Time Calculation:         PT Charges     Row Name 04/15/18 1412             Time Calculation    Start Time 1350  -LF      PT Received On 04/15/18  -      PT Goal Re-Cert Due Date 04/20/18  -        User Key  (r) = Recorded By, (t) = Taken By, (c) = Cosigned By    Initials Name Provider Type     Dipti England, ANA Physical Therapist          Therapy Charges for Today     Code Description Service Date Service Provider Modifiers Qty    26875806455 HC PT EVAL LOW COMPLEXITY 3 4/15/2018 Dipti England, PT GP 1          PT G-Codes  Outcome Measure Options: AM-PAC 6 Clicks Basic Mobility (PT)      Dipti England PT  4/15/2018

## 2018-04-15 NOTE — PROGRESS NOTES
Please see the history and physical dated today for full details.  I've seen and examined the patient.  He is a 73-year-old male who had a sarcoma removed from his scalp on April 11 and return the next day for closure per his report.  The following day on the 13th he became very weak and developed fevers.  He describes mostly bloody drainage from the wound.  He presented to an outside emergency room yesterday and was found to be tachycardic and hypotensive with temperature greater than 101.  He received 2 L of normal saline.  Labs were remarkable for a lactic acid 3.3, potassium 2.8, sodium 129, white blood cell count of 13.3.  He is transferred to [facility] for further evaluation.    Currently blood pressures improved to 120/65 remains tachycardic around 110.  The wound itself involves the top of his scalp and is mostly dried blood however can express some purulent material.  This was sent for culture.  Currently is on empiric antibiotics.  Infectious disease to evaluate the patient.  I will try to contact dermatology to get their recommendations.  For now I'll continue with wound care.  Electrolytes are improved from the outside hospital, continue replacement and IV fluid resuscitation.  Hold his antihypertensives.    Electronically signed by Yaya Becker MD, 04/15/18, 9:16 AM.

## 2018-04-15 NOTE — NURSING NOTE
ACC REVIEW REPORT: [facility]        PATIENT NAME: Tori Manzanares    PATIENT ID: [id number]    BED: [id number]    BED TYPE: TELE    BED GIVEN TO: TORI MARIEE RN    TIME BED GIVEN: 0011    YOB: 1945    AGE: 73    GENDER: MALE    PREVIOUS ADMIT TO [facility]: N    PREVIOUS ADMISSION DATE: N    PATIENT CLASS: INPATIENT    TODAY'S DATE: 4/15/2018    TRANSFER DATE: 4/15/18    ETA: 0115    TRANSFERRING FACILITY: [facility]    TRANSFERRING FACILITY PHONE # : [phone]    TRANSFERRING MD: AB    DATE/TIME REQUEST RECEIVED: 4/14/18 @2535    [facility] RN: YOLANDA MARIE    REPORT FROM: TORI MARIEE RN    TIME REPORT TAKEN: 0011    DIAGNOSIS: SEPSIS    REASON FOR TRANSFER TO [facility]: HIGHER LEVEL OF CARE    TRANSPORTATION: AMBULANCE    CLINICAL REASON FOR TRANSFER TO [facility]: PT PRESENTED TO ER WITH NAUSEA VOMITING, WEAKNESS AND DECREASED APPETITE.  PT HAD A SARCOMA REMOVED FROM HIS SCALP LAST Thursday.  PT NOW WITH FEVER AND CHILLS AND A TEMP .5.  RIGHT SIDE OF FACE SWOLLEN AND SLIGHLTY RED PER ER NURSE.    CLINICAL INFORMATION    ALLERGIES: SULFS  AUGMENTIN    MULLEN: N    INFECTIOUS DISEASE: N    ISOLATION: N    1ST VITAL SIGNS:   TIME: 2003  TEMP: 101.5  PULSE: 123  B/P: 101/58  RESP: 18    LAST VITAL SIGNS:  TIME: 0011  TEMP: 101.5  PULSE: 105  B/P: 106/56  RESP: 19    LAB INFORMATION: WBC 13.3  K 2.8    LACTIC ACID 3.3    CULTURE INFORMATION: BLOOD CULTURES DRAWN X 2    MEDS/IV FLUIDS:   20G RAC  20G LAC  POTASSIUM 10MEQ IV  AZETROENAL 1GM IV  VANCOMYCIN 1GM IV  2 LITER BOLUS NORMAL SALINE  TYLENOL 650MG PO  ZOFRAN 4MG IVP  PEPCID 20MG      CARDIAC SYSTEM:    CHEST PAIN: N    RATE: N    SCALE: N    RHYTHM: SINUS TACHYCARDIA    Is patient taking or has patient been given any drugs that could increase bleeding? Y  (Plavix, Brilinta, Effient, Eliquis, Xarelto, Warfarin, Integrilin, Angiomax)    DRUG: ASA     DOSE/FREQUENCY: 81MG DAILY      OXYGEN: N    O2 SAT: 91    ADMINISTRATION ROUTE: ROOM  AIR      CNS/MUSCULOSKELETAL    ALERT AND ORIENTED:    PERSON: Y  PLACE: Y  TIME: Y    INJURY:  WHERE: N/A    GINO COMA SCALE:    E: 4  M: 6  V: 5      GI//GY      ABDOMINAL PAIN: N    VOMITING: Y    DIARRHEA: N    NAUSEA: Y    BOWEL SOUNDS: ACTIVE    OCCULT STOOL: N    VAGINAL BLEEDING: N/A    TESTICULAR PAIN: N    HEMATURIA: N    PAST MEDICAL HISTORY: SARCOMA  HTN  KIDNEY STONES  BASAL CELL CARCINOMA  CABG X 5 VESSELL              Mala Avendano RN  4/15/2018  12:27 AM

## 2018-04-15 NOTE — CONSULTS
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 male with h/o CAD/CABG, HTN, and Prostate cancer/Zytiga with prednisone/XRT 2009 who presented to [facility] on 4/14/18 for fever with decreased oral intake, weakness, nausea and vomiting that started the day before.  He had a sarcoma excision from scalp on 4/11/18 with delayed closure on 4/12/18 by Dr. Hameed of [facility].  He does complain of right facial swelling and redness.  At [facility], his Tmax is 101.5, lactic acid 3.1, sCr 1.33, WBC 13,000 with 88% neutrophils. He was given Vancomycin and Aztreonam.  He was transferred to [facility] for further medical management.  Work up here shows Hgb A1C 6.6, sed rate 64, CRP 22.44, PCT 0.75, WBC 13,000 with 90% neutrophils, sCr 0.8, and lactic acid 1.5.  Blood and scalp wound are pending. He was started on Azactam and Vancomycin.  ID was asked to evaluate and manage his antibiotic therapy.    Past Medical History:   Diagnosis Date   • Basal cell carcinoma    • CAD (coronary artery disease) 4/15/2018   • HTN (hypertension) 4/15/2018   • Hyperglycemia 4/15/2018   • Hypokalemia 4/15/2018   • Hyponatremia 4/15/2018   • Kidney stones    • Myocardial infarction     2010, CABG x5   • Prostate CA     had spread outside of prostate when found, radiation to area only. on oral zytiga. still present. no surgical intervention.   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   • Sepsis 4/15/2018       Past Surgical History:   Procedure Laterality Date   • CARDIAC CATHETERIZATION      2010; s/p CABG after   • CORONARY ARTERY BYPASS GRAFT      x5   • ENDOSCOPY       2006, dr burroughs, for dysphagia, pathology in chart   • KIDNEY STONE SURGERY     • TRIGGER FINGER RELEASE      x2, one on each hand, pinky fingers       Family History   Problem Relation Age of Onset   • Stroke Mother    • Heart attack Father    • Stroke Sister    • No Known Problems Son        Social History     Social History   • Marital status:      Spouse name: N/A   • Number of children: N/A   • Years of education: N/A     Occupational History   • Not on file.     Social History Main Topics   • Smoking status: Never Smoker   • Smokeless tobacco: Never Used   • Alcohol use No   • Drug use: No   • Sexual activity: Defer     Other Topics Concern   • Not on file     Social History Narrative    Mr. Manzanares is a 73 year old white male. They live in [facility]. He has two sons. He is retired from the [employer]. He does not have an advanced directive.       Allergies   Allergen Reactions   • Augmentin [Amoxicillin-Pot Clavulanate] Hives   • Sulfa Antibiotics Rash     Red like a sunburn all over         Medication:    Current Facility-Administered Medications:   •  [START ON 4/16/2018] ! Vancomycin Trough before 18:00 dose on Monday 4/16/18; Hold dose if level > 20, , Does not apply, Once, Dusty Gonzalez, [facility]  •  acetaminophen (TYLENOL) tablet 650 mg, 650 mg, Oral, Q4H PRN, AUDREY Hopkins  •  aspirin EC tablet 81 mg, 81 mg, Oral, Daily, AUDREY Hopkins, 81 mg at 04/15/18 0855  •  atorvastatin (LIPITOR) tablet 20 mg, 20 mg, Oral, Nightly, AUDREY Hopkins  •  aztreonam (AZACTAM) 1 g in sodium chloride 0.9 % 100 mL IVPB-MBP, 1 g, Intravenous, Q8H, AUDREY Hopkins, Last Rate: 25 mL/hr at 04/15/18 0640, 1 g at 04/15/18 0640  •  bisacodyl (DULCOLAX) EC tablet 5 mg, 5 mg, Oral, Daily PRN, Nova Tinajero APRN  •  dextrose (D50W) solution 25 g, 25 g, Intravenous, Q15 Min PRN, Nova Tinajero APRN  •  dextrose (GLUTOSE) oral gel 15 g, 15 g, Oral, Q15 Min PRN,  AUDREY Hopkins  •  docusate sodium (COLACE) capsule 100 mg, 100 mg, Oral, BID PRN, AUDREY Hopkins  •  famotidine (PEPCID) tablet 20 mg, 20 mg, Oral, BID PRN, AUDREY Hopkins  •  fluticasone (FLONASE) 50 MCG/ACT nasal spray 2 spray, 2 spray, Each Nare, Daily, AUDREY Hopkins, 2 spray at 04/15/18 0857  •  glucagon (human recombinant) (GLUCAGEN DIAGNOSTIC) injection 1 mg, 1 mg, Subcutaneous, PRN, AUDREY Hopkins  •  heparin (porcine) 5000 UNIT/ML injection 5,000 Units, 5,000 Units, Subcutaneous, Q12H, AUDREY Hopkins, 5,000 Units at 04/15/18 0855  •  insulin lispro (humaLOG) injection 0-7 Units, 0-7 Units, Subcutaneous, 4x Daily With Meals & Nightly, AUDREY Hopkins  •  lactobacillus acidophilus (RISAQUAD) capsule 1 capsule, 1 capsule, Oral, Daily, AUDREY Hopkins, 1 capsule at 04/15/18 0855  •  Magnesium Sulfate 2 gram Bolus, followed by 8 gram infusion (total Mg dose 10 grams)- Mg less than or equal to 1mg/dL, 2 g, Intravenous, PRN **OR** Magnesium Sulfate 6 gram Infusion (2 gm x 3) -Mg 1.1 -1.5 mg/dL, 2 g, Intravenous, PRN **OR** magnesium sulfate 4 gram infusion- Mg 1.6-1.9 mg/dL, 4 g, Intravenous, PRN, AUDREY Hopkins  •  ondansetron (ZOFRAN) tablet 4 mg, 4 mg, Oral, Q6H PRN **OR** ondansetron (ZOFRAN) injection 4 mg, 4 mg, Intravenous, Q6H PRN, AUDREY Hopkins  •  pantoprazole (PROTONIX) EC tablet 40 mg, 40 mg, Oral, Q AM, AUDREY Hopkins  •  Pharmacy to dose vancomycin, , Does not apply, Continuous PRN, AUDREY Hopkins  •  potassium chloride (MICRO-K) CR capsule 40 mEq, 40 mEq, Oral, PRN **OR** potassium chloride (KLOR-CON) packet 40 mEq, 40 mEq, Oral, PRN **OR** potassium chloride in NS 20 mEq/250 mL IVPB, 20 mEq, Intravenous, Q2H PRN, AUDREY Hopkins  •  promethazine (PHENERGAN) injection 12.5 mg, 12.5 mg, Intravenous, Q6H PRN, AUDREY Hopkins  •  sodium chloride 0.9 %  flush 1-10 mL, 1-10 mL, Intravenous, PRN, AUDREY Hopkins  •  sodium chloride 0.9 % with KCl 20 mEq/L infusion, 75 mL/hr, Intravenous, Continuous, AUDREY Hopkins, Last Rate: 75 mL/hr at 04/15/18 0559, 75 mL/hr at 04/15/18 0559  •  timolol (TIMOPTIC) 0.5 % ophthalmic solution 1 drop, 1 drop, Both Eyes, Daily, AUDREY Hopkins, 1 drop at 04/15/18 0855  •  vancomycin 1250 mg/250 mL 0.9% NS IVPB ([facility]), 15 mg/kg, Intravenous, Q12H, Dusty Gonzalez RPH    Antibiotics:  Anti-Infectives     Ordered     Dose/Rate Route Frequency Start Stop    04/15/18 0618  vancomycin 1250 mg/250 mL 0.9% NS IVPB ([facility])     Ordering Provider:  Dusty Gonzalez RPH    15 mg/kg × 81.8 kg  over 90 Minutes Intravenous Every 12 Hours 04/15/18 1800 04/22/18 1759    04/15/18 0403  aztreonam (AZACTAM) 1 g in sodium chloride 0.9 % 100 mL IVPB-MBP     Comments:  Pt received 1 dose 4/14/18 2245 at OSH   Ordering Provider:  AUDREY Hopkins    1 g  25 mL/hr over 4 Hours Intravenous Every 8 Hours 04/15/18 0600 04/25/18 0559    04/15/18 0413  vancomycin in dextrose 5% 150 mL (VANCOCIN) IVPB 750 mg     Ordering Provider:  Dusty Gonzalez RPH    750 mg  over 60 Minutes Intravenous Once 04/15/18 0445 04/15/18 0700    04/15/18 0403  Pharmacy to dose vancomycin     Ordering Provider:  AUDREY Hopkins     Does not apply Continuous PRN 04/15/18 0402 04/25/18 0401            Review of Systems:  Constitutional-- + Fever, chills, no sweats.  Appetite good, and no malaise. No fatigue.  HEENT-- No new vision, hearing or throat complaints.  No epistaxis or oral sores.  Denies odynophagia or dysphagia. No headache, photophobia or neck stiffness.  Had right eye swelling with redness extending to cheek, now with left inferior eye swelling and redness extending into cheek.  CV-- No chest pain, palpitation or syncope  Resp-- No SOB/cough/Hemoptysis  GI- + nausea, + vomiting, no diarrhea.  No hematochezia, melena, or hematemesis.  Denies jaundice or chronic liver disease.  -- No dysuria, hematuria, or flank pain.  Denies hesitancy, urgency or flank pain.  Lymph- no swollen lymph nodes in neck/axilla or groin.   Heme- No active bruising or bleeding; no Hx of DVT or PE.  MS-- no swelling or pain in the bones or joints of arms/legs.  No new back pain.  Neuro-- No acute focal weakness or numbness in the arms or legs.  No seizures.  Skin--No rashes.  Lesion on top of scalp after sarcoma removal and tender      Physical Exam:   Vital Signs  Temp (24hrs), Av.1 °F (36.7 °C), Min:97.7 °F (36.5 °C), Max:98.7 °F (37.1 °C)    Temp  Min: 97.7 °F (36.5 °C)  Max: 98.7 °F (37.1 °C)  BP  Min: 97/60  Max: 128/78  Pulse  Min: 107  Max: 113  Resp  Min: 18  Max: 18  SpO2  Min: 94 %  Max: 94 %    GENERAL: Awake and alert, in no acute distress.   HEENT: Normocephalic, atraumatic.  PERRL. EOMI. No conjunctival injection. No icterus. Oropharynx clear without evidence of thrush or exudate. No evidence of peridontal disease.  Right cheek erythema, left inferior periorbital swelling with left cheek erythema.  NECK: Supple without nuchal rigidity. No mass.  LYMPH: No cervical, axillary or inguinal lymphadenopathy.  HEART: RRR; No murmur, rubs, gallops.   LUNGS: Clear to auscultation bilaterally without wheezing, rales, rhonchi. Normal respiratory effort. Nonlabored. No dullness.  ABDOMEN: Soft, nontender, nondistended. Positive bowel sounds. No rebound or guarding. NO mass or HSM.  EXT:  No cyanosis, clubbing or edema. No cord.  : Genitalia generally unremarkable.  Without Amaya catheter.  MSK: FROM without joint effusions noted arms/legs.    SKIN: Warm and dry without cutaneous eruptions on Inspection/palpation.  Scalp wound with dried blood with purulent discharge in central area of wound, tender to palp.  Surrounding erythema.   NEURO: Oriented to PPT. No focal deficits on motor/sensory exam at arms/legs.  PSYCHIATRIC: Normal insight and judgement. Cooperative  with PE    Laboratory Data      Results from last 7 days  Lab Units 04/15/18  0519   WBC 10*3/mm3 12.58*   HEMOGLOBIN g/dL 11.1*   HEMATOCRIT % 34.0*   PLATELETS 10*3/mm3 215       Results from last 7 days  Lab Units 04/15/18  0521   SODIUM mmol/L 130*   POTASSIUM mmol/L 3.1*   CHLORIDE mmol/L 98*   CO2 mmol/L 25.0   BUN mg/dL 17   CREATININE mg/dL 0.80   GLUCOSE mg/dL 133*   CALCIUM mg/dL 8.7       Results from last 7 days  Lab Units 04/15/18  0521   ALK PHOS U/L 83   BILIRUBIN mg/dL 0.8   ALT (SGPT) U/L 11   AST (SGOT) U/L 11       Results from last 7 days  Lab Units 04/15/18  0519   SED RATE mm/hr 64*       Results from last 7 days  Lab Units 04/15/18  0521   CRP mg/dL 22.44*       Results from last 7 days  Lab Units 04/15/18  0521   LACTATE mmol/L 1.5             Estimated Creatinine Clearance: 98 mL/min (by C-G formula based on SCr of 0.8 mg/dL).      Microbiology:      Blood cx pending  Scalp wound pending                          Radiology:  Imaging Results (last 72 hours)     Procedure Component Value Units Date/Time    XR Chest 1 View [[id number]] Collected:  04/15/18 0923     Updated:  04/15/18 0923    Narrative:          EXAMINATION: XR CHEST 1 VW-      INDICATION: sepsis, hypoxia      COMPARISON: NONE     FINDINGS: Sternotomy wires noted. The heart and vasculature are normal  in size. There is mild focal discoid atelectasis the right midlung and  perhaps minimal discoid atelectasis left base. Lungs otherwise appear  clear. No effusion or pneumothorax is seen.           Impression:       Mild bilateral discoid atelectasis.                Estimated Creatinine Clearance: 98 mL/min (by C-G formula based on SCr of 0.8 mg/dL).    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  --Fever,  hectic--resolved  --Leukocytosis/neutrophilia, ongoing  --Lactic acidosis, resolved  --Elevated procalcitonin  --Hyponatremia/hypokalemia  --OJHN, resolved.  --Prostate cancer s/p XRT, on Zytiga/pred  --Elevated Hgb A1C  --HTN  --CAD/h/o CABG    PLAN/RECOMMENDATIONS:   Thank you for asking us to see Dusty Manzanares, I recommend the following:  --Monitor cultures, labs, KAREN  --Continue Vancomycin for now  --D/c Aztreonam and change to Rocephin 1 GM IV daily.  If he tolerates the low dose Rocephin today, then will increase to 2 GM IV daily.  --Continue wound care as per Dr. Zari Orlando MD saw and examined patient, verified hx and PE, read all radiographic studies, reviewed labs and micro data, and formulated dx, plan for treatment and all medical decision making.      JERALD So-C for Darryn Orlando MD  F/u wound cultures  Cont vanco  Start rocephin    I have seen and examined patient and agree with above    JERALD So  4/15/2018  12:22 PM

## 2018-04-15 NOTE — PLAN OF CARE
Problem: Patient Care Overview  Goal: Plan of Care Review  Outcome: Ongoing (interventions implemented as appropriate)   04/15/18 1415   Coping/Psychosocial   Plan of Care Reviewed With patient   OTHER   Outcome Summary PT eval complete. Sandra supine>sit, and ambulated ' w/o LOB. Will follow-up to maximize functional mobility toward PLOF   Plan of Care Review   Progress improving

## 2018-04-15 NOTE — PLAN OF CARE
Problem: Patient Care Overview  Goal: Plan of Care Review  Outcome: Ongoing (interventions implemented as appropriate)   04/15/18 2131   Coping/Psychosocial   Plan of Care Reviewed With patient;family;spouse;other (see comments)  (RN Marylin and AUDREY)   OTHER   Outcome Summary WOC nurse consult for recent sarcoma removed to scalp with delayed closure. Pt has surgery on 4/11 and 4/12. Per Family, the surgeon that did the surgery is to follow up with the patient today, will be here this afternoon. Family DID NOT want me to touch the dressing, but to wait for the doctor. The wife stated the doctor that did the surgery wanted it cleaned with vinegar solution today and left LYNN. WOC nurse to follow up at a later date if needed. Pt has swelling of the left eye as well. Respecting family wishes, dressing not removed. Will follow up if needed.

## 2018-04-16 PROBLEM — L03.811 ABSCESS OR CELLULITIS OF SCALP: Status: ACTIVE | Noted: 2018-04-16

## 2018-04-16 LAB
ANION GAP SERPL CALCULATED.3IONS-SCNC: 7 MMOL/L (ref 3–11)
BUN BLD-MCNC: 10 MG/DL (ref 9–23)
BUN/CREAT SERPL: 16.7 (ref 7–25)
CALCIUM SPEC-SCNC: 8.3 MG/DL (ref 8.7–10.4)
CHLORIDE SERPL-SCNC: 102 MMOL/L (ref 99–109)
CO2 SERPL-SCNC: 26 MMOL/L (ref 20–31)
CREAT BLD-MCNC: 0.6 MG/DL (ref 0.6–1.3)
GFR SERPL CREATININE-BSD FRML MDRD: 133 ML/MIN/1.73
GLUCOSE BLD-MCNC: 96 MG/DL (ref 70–100)
GLUCOSE BLDC GLUCOMTR-MCNC: 82 MG/DL (ref 70–130)
POTASSIUM BLD-SCNC: 3 MMOL/L (ref 3.5–5.5)
SODIUM BLD-SCNC: 135 MMOL/L (ref 132–146)
VANCOMYCIN TROUGH SERPL-MCNC: 14.8 MCG/ML (ref 10–20)

## 2018-04-16 PROCEDURE — 63710000001 PREDNISONE PER 5 MG: Performed by: INTERNAL MEDICINE

## 2018-04-16 PROCEDURE — 80048 BASIC METABOLIC PNL TOTAL CA: CPT | Performed by: INTERNAL MEDICINE

## 2018-04-16 PROCEDURE — 84132 ASSAY OF SERUM POTASSIUM: CPT | Performed by: INTERNAL MEDICINE

## 2018-04-16 PROCEDURE — 25010000002 HEPARIN (PORCINE) PER 1000 UNITS: Performed by: NURSE PRACTITIONER

## 2018-04-16 PROCEDURE — 82962 GLUCOSE BLOOD TEST: CPT

## 2018-04-16 PROCEDURE — 80202 ASSAY OF VANCOMYCIN: CPT

## 2018-04-16 PROCEDURE — 25010000002 VANCOMYCIN

## 2018-04-16 PROCEDURE — 99233 SBSQ HOSP IP/OBS HIGH 50: CPT | Performed by: INTERNAL MEDICINE

## 2018-04-16 PROCEDURE — 25010000002 CEFTRIAXONE PER 250 MG: Performed by: PHYSICIAN ASSISTANT

## 2018-04-16 RX ORDER — ABIRATERONE ACETATE 250 MG/1
1000 TABLET ORAL NIGHTLY
Status: DISCONTINUED | OUTPATIENT
Start: 2018-04-16 | End: 2018-04-17 | Stop reason: HOSPADM

## 2018-04-16 RX ORDER — PREDNISONE 1 MG/1
5 TABLET ORAL 2 TIMES DAILY
Status: DISCONTINUED | OUTPATIENT
Start: 2018-04-16 | End: 2018-04-17 | Stop reason: HOSPADM

## 2018-04-16 RX ORDER — CEFTRIAXONE SODIUM 2 G/50ML
2 INJECTION, SOLUTION INTRAVENOUS EVERY 24 HOURS
Status: DISCONTINUED | OUTPATIENT
Start: 2018-04-17 | End: 2018-04-17 | Stop reason: HOSPADM

## 2018-04-16 RX ADMIN — MUPIROCIN: 20 OINTMENT TOPICAL at 17:08

## 2018-04-16 RX ADMIN — POTASSIUM CHLORIDE 40 MEQ: 750 CAPSULE, EXTENDED RELEASE ORAL at 19:26

## 2018-04-16 RX ADMIN — POTASSIUM CHLORIDE 40 MEQ: 750 CAPSULE, EXTENDED RELEASE ORAL at 13:39

## 2018-04-16 RX ADMIN — HEPARIN SODIUM 5000 UNITS: 5000 INJECTION, SOLUTION INTRAVENOUS; SUBCUTANEOUS at 09:01

## 2018-04-16 RX ADMIN — VANCOMYCIN HYDROCHLORIDE 1250 MG: 10 INJECTION, POWDER, LYOPHILIZED, FOR SOLUTION INTRAVENOUS at 05:00

## 2018-04-16 RX ADMIN — ATORVASTATIN CALCIUM 20 MG: 20 TABLET, FILM COATED ORAL at 21:50

## 2018-04-16 RX ADMIN — Medication 1 CAPSULE: at 09:00

## 2018-04-16 RX ADMIN — MUPIROCIN: 20 OINTMENT TOPICAL at 09:02

## 2018-04-16 RX ADMIN — Medication: at 19:21

## 2018-04-16 RX ADMIN — FLUTICASONE PROPIONATE 2 SPRAY: 50 SPRAY, METERED NASAL at 09:02

## 2018-04-16 RX ADMIN — ABIRATERONE ACETATE 1000 MG: 250 TABLET ORAL at 21:54

## 2018-04-16 RX ADMIN — ASPIRIN 81 MG: 81 TABLET, COATED ORAL at 09:00

## 2018-04-16 RX ADMIN — HEPARIN SODIUM 5000 UNITS: 5000 INJECTION, SOLUTION INTRAVENOUS; SUBCUTANEOUS at 21:50

## 2018-04-16 RX ADMIN — VANCOMYCIN HYDROCHLORIDE 1250 MG: 10 INJECTION, POWDER, LYOPHILIZED, FOR SOLUTION INTRAVENOUS at 19:21

## 2018-04-16 RX ADMIN — PREDNISONE 5 MG: 5 TABLET ORAL at 09:00

## 2018-04-16 RX ADMIN — CEFTRIAXONE SODIUM 1 G: 1 INJECTION, SOLUTION INTRAVENOUS at 13:04

## 2018-04-16 RX ADMIN — TIMOLOL MALEATE 1 DROP: 5 SOLUTION/ DROPS OPHTHALMIC at 09:02

## 2018-04-16 RX ADMIN — POTASSIUM CHLORIDE 40 MEQ: 750 CAPSULE, EXTENDED RELEASE ORAL at 09:00

## 2018-04-16 RX ADMIN — MUPIROCIN: 20 OINTMENT TOPICAL at 21:55

## 2018-04-16 RX ADMIN — PREDNISONE 5 MG: 5 TABLET ORAL at 21:50

## 2018-04-16 RX ADMIN — PANTOPRAZOLE SODIUM 40 MG: 40 TABLET, DELAYED RELEASE ORAL at 05:00

## 2018-04-16 NOTE — PROGRESS NOTES
[facility] Medicine Services  PROGRESS NOTE    Patient Name: Dusty Manzanares  : 1945  MRN: [id number]    Date of Admission: 4/15/2018  Length of Stay: 1  Primary Care Physician: Shalom Holt MD    Subjective   Subjective     CC:  F/u sepsis, scalp wound    HPI:  Says he didn't sleep well.  Scalp pain was ok, still having some significant bloody drainage.      Review of Systems   Constitutional: Positive for fever.   Respiratory: Negative for shortness of breath.    Cardiovascular: Negative for chest pain.   Gastrointestinal: Negative for abdominal pain.   Neurological: Positive for weakness.   All other systems reviewed and are negative.    Otherwise ROS is negative except as mentioned in the HPI.    Objective   Objective     Vital Signs:   Temp:  [97.7 °F (36.5 °C)-98.9 °F (37.2 °C)] 98.9 °F (37.2 °C)  Heart Rate:  [] 106  Resp:  [16-18] 18  BP: (121-141)/(69-77) 121/69        Physical Exam:  Constitutional: No acute distress, awake, alert  HENT: NCAT.  Has clear dressing on scalp, some bloody purulent drainage visible.  Some right periorbital edema  Respiratory: Clear to auscultation bilaterally, respiratory effort normal   Cardiovascular: tachy, regular, no murmurs, rubs, or gallops  Gastrointestinal: Positive bowel sounds, soft, nontender, nondistended  Musculoskeletal: No bilateral ankle edema  Psychiatric: Appropriate affect, cooperative  Neurologic: Oriented x 3, strength symmetric in all extremities, Cranial Nerves grossly intact to confrontation, speech clear  Skin: No rashes    Results Reviewed:  I have personally reviewed current lab, radiology, and data and agree.      Results from last 7 days  Lab Units 04/15/18  0519 04/15/18  0513   WBC 10*3/mm3 12.58*  --    HEMOGLOBIN g/dL 11.1*  --    HEMATOCRIT % 34.0*  --    PLATELETS 10*3/mm3 215  --    INR   --  1.00       Results from last 7 days  Lab Units 18  0535 04/15/18  0521   SODIUM mmol/L 135 130*    POTASSIUM mmol/L 3.0* 3.1*   CHLORIDE mmol/L 102 98*   CO2 mmol/L 26.0 25.0   BUN mg/dL 10 17   CREATININE mg/dL 0.60 0.80   GLUCOSE mg/dL 96 133*   CALCIUM mg/dL 8.3* 8.7   ALT (SGPT) U/L  --  11   AST (SGOT) U/L  --  11     Estimated Creatinine Clearance: 98 mL/min (by C-G formula based on SCr of 0.6 mg/dL).  BNP   Date Value Ref Range Status   04/15/2018 83.0 0.0 - 100.0 pg/mL Final     Comment:     Results may be falsely decreased if patient taking Biotin.     No results found for: PHART    Microbiology Results Abnormal     Procedure Component Value - Date/Time    Wound Culture - Wound, Scalp [[id number]]  (Normal) Collected:  04/15/18 1035    Lab Status:  Preliminary result Specimen:  Wound from Scalp Updated:  04/16/18 0648     Wound Culture Culture in progress     Gram Stain Result No WBCs seen      Occasional Gram positive cocci in pairs    Blood Culture - Blood, [[id number]]  (Normal) Collected:  04/15/18 0513    Lab Status:  Preliminary result Specimen:  Blood from Hand, Left Updated:  04/16/18 0631     Blood Culture No growth at 24 hours    Blood Culture - Blood, [[id number]]  (Normal) Collected:  04/15/18 0513    Lab Status:  Preliminary result Specimen:  Blood from Arm, Left Updated:  04/16/18 0631     Blood Culture No growth at 24 hours          Imaging Results (last 24 hours)     Procedure Component Value Units Date/Time    XR Chest 1 View [[id number]] Collected:  04/15/18 0923     Updated:  04/16/18 0006    Narrative:          EXAMINATION: XR CHEST, SINGLE VIEW - 04/15/2018     INDICATION: Sepsis, hypoxia.      COMPARISON: None.     FINDINGS: Sternotomy wires are noted. The heart and vasculature appear  normal in size. There is mild focal discoid atelectasis in the right  midlung and perhaps minimal discoid atelectasis at the left base. Lungs  otherwise appear clear. No effusion or pneumothorax is seen.           Impression:       Mild bilateral discoid atelectasis.     DICTATED:      04/15/2018  EDITED/ls :     04/15/2018      This report was finalized on 4/16/2018 12:04 AM by DR. Thony Paredes MD.                I have reviewed the medications.    Assessment/Plan   Assessment / Plan     Hospital Problem List     * (Principal)Sepsis    Abscess or cellulitis of scalp    HTN (hypertension) (Chronic)    CAD (coronary artery disease) (Chronic)    Sarcoma    Prostate cancer (Chronic)    Hyperglycemia    Hyponatremia    Hypokalemia             Brief Hospital Course to date:  Dusty Manzanares is a 73 y.o. male who had a sarcoma removed from his scalp on April 11 and return the next day for closure per his report.  He was prescribed doxy, but unable to take due to development of N/V.  The following day on the 13th he became very weak and developed fevers.  He describes mostly bloody drainage from the wound.  He presented to an outside emergency room yesterday and was found to be tachycardic and hypotensive with temperature greater than 101.    Assessment & Plan:  - seen at the bedside 4/15 with Dr. Hameed (no note from him d/t unfamiliarity with Epic).  Hemorrhagic crust was removed by him and revealed intact closure and sutures.  All tissue looked viable.  Was able to express some purulence from middle.  He recommends bid topical bactroban and dressing changes.  - wound culture gram stain with GPC, culture pending.  Will f/u blood cultures from [facility].  - currently on vanc/rocephin per Dr. Orlando  - hyponatremia resolved.  K+ remains low, continue to replace.    - d/c IVF today.  Continue to hold ARB/HCTZ and monitor BP.  - will d/c insulin and accucheks d/t resolution of hyperglycemia , a1c=6.6%  - will f/u as outpatient with Dr. Salazar for possible XRT for his sarcoma.  - possible d/c tomorrow depending on abx plan.      DVT Prophylaxis:  mechanical    CODE STATUS: Full Code    Disposition: I expect the patient to be discharged 1-2 days.      Electronically signed by Yaya Becker MD,  04/16/18, 8:07 AM.

## 2018-04-16 NOTE — PROGRESS NOTES
[facility] Progress Note    Admission Date: 4/15/2018    Dusty Manzanares  1945  [id number]    Date: 4/16/2018    Meds:    Anti-Infectives     Ordered     Dose/Rate Route Frequency Start Stop    04/15/18 0618  vancomycin 1250 mg/250 mL 0.9% NS IVPB ([facility])     Ordering Provider:  Dusty Gonzalez RPH    15 mg/kg × 81.8 kg  over 90 Minutes Intravenous Every 12 Hours 04/15/18 1800 04/22/18 1759    04/15/18 1316  cefTRIAXone (ROCEPHIN) IVPB 1 g     Comments:  Monitor for any KAREN   Ordering Provider:  JERALD So    1 g  100 mL/hr over 30 Minutes Intravenous Every 24 Hours 04/15/18 1400 04/25/18 1344    04/15/18 0413  vancomycin in dextrose 5% 150 mL (VANCOCIN) IVPB 750 mg     Ordering Provider:  Dusty Gonzalez RPH    750 mg  over 60 Minutes Intravenous Once 04/15/18 0445 04/15/18 0700    04/15/18 0403  Pharmacy to dose vancomycin     Ordering Provider:  Nova Tinajero, AUDREY     Does not apply Continuous PRN 04/15/18 0402 04/25/18 0401          CC:  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 male with h/o CAD/CABG, HTN, and Prostate cancer/Zytiga with prednisone/XRT 2009 who presented to [facility] on 4/14/18 for fever with decreased oral intake, weakness, nausea and vomiting that started the day before.  He had a sarcoma excision from scalp on 4/11/18 with delayed closure on 4/12/18 by Dr. Hameed of [facility].  He does complain of right facial swelling and redness.  At [facility], his Tmax is 101.5, lactic acid 3.1, sCr 1.33, WBC 13,000 with 88% neutrophils. He was given Vancomycin and Aztreonam.  He was transferred to [facility] for further medical management.  Work up here shows Hgb A1C 6.6, sed rate 64, CRP 22.44, PCT 0.75, WBC 13,000 with 90% neutrophils, sCr 0.8, and lactic acid 1.5.  Blood and scalp wound are pending. He was started on Azactam and Vancomycin.  ID was asked to evaluate and manage his antibiotic  therapy.    18: Ambulatory.  Still with periorbital swelling.  Saw Dr. Hameed yesterday afternoon and the wound was cleaned up with some purulence from middle of wound.  Sent for culture and positive for Staph aureus with susc pending.     PE:   Vital Signs  Temp (24hrs), Av.8 °F (37.1 °C), Min:98.6 °F (37 °C), Max:98.9 °F (37.2 °C)    Temp  Min: 98.6 °F (37 °C)  Max: 98.9 °F (37.2 °C)  BP  Min: 121/69  Max: 141/77  Pulse  Min: 78  Max: 112  Resp  Min: 16  Max: 18  No Data Recorded    GENERAL: Awake and alert, in no acute distress.   HEENT: Normocephalic, atraumatic.  PERRL. EOMI. No conjunctival injection. No icterus. Oropharynx clear without evidence of thrush or exudate. No evidence of peridontal disease.  Bilateral inferior periorbital swelling, with vague erythema.    HEART: RRR; No murmur, rubs, gallops.   LUNGS: Clear to auscultation bilaterally without wheezing, rales, rhonchi.  ABDOMEN: Soft, nontender, nondistended. Positive bowel sounds.   EXT:  No cyanosis, clubbing or edema. No cord.    MSK: FROM without joint effusions noted arms/legs.    SKIN: Warm and dry without cutaneous eruptions on Inspection/palpation.  Scalp wound with drainage at middle of wound, remaining wound is c/d/i with sutures. With improving surrounding erythema.       Laboratory Data      Results from last 7 days  Lab Units 04/15/18  0519   WBC 10*3/mm3 12.58*   HEMOGLOBIN g/dL 11.1*   HEMATOCRIT % 34.0*   PLATELETS 10*3/mm3 215       Results from last 7 days  Lab Units 18  0535   SODIUM mmol/L 135   POTASSIUM mmol/L 3.0*   CHLORIDE mmol/L 102   CO2 mmol/L 26.0   BUN mg/dL 10   CREATININE mg/dL 0.60   GLUCOSE mg/dL 96   CALCIUM mg/dL 8.3*       Results from last 7 days  Lab Units 04/15/18  0521   ALK PHOS U/L 83   BILIRUBIN mg/dL 0.8   ALT (SGPT) U/L 11   AST (SGOT) U/L 11       Results from last 7 days  Lab Units 04/15/18  0519   SED RATE mm/hr 64*       Results from last 7 days  Lab Units 04/15/18  0521   CRP mg/dL  22.44*       Results from last 7 days  Lab Units 04/15/18  0521   LACTATE mmol/L 1.5             Estimated Creatinine Clearance: 98 mL/min (by C-G formula based on SCr of 0.6 mg/dL).    Microbiology:  Blood Culture   Date Value Ref Range Status   04/15/2018 No growth at 24 hours  Preliminary   04/15/2018 No growth at 24 hours  Preliminary                       Wound Culture   Date Value Ref Range Status   04/15/2018 Heavy growth (4+) Staphylococcus aureus (A)  Preliminary       Radiology:  Imaging Results (last 72 hours)     Procedure Component Value Units Date/Time    XR Chest 1 View [[id number]] Collected:  04/15/18 0923     Updated:  04/16/18 0006    Narrative:          EXAMINATION: XR CHEST, SINGLE VIEW - 04/15/2018     INDICATION: Sepsis, hypoxia.      COMPARISON: None.     FINDINGS: Sternotomy wires are noted. The heart and vasculature appear  normal in size. There is mild focal discoid atelectasis in the right  midlung and perhaps minimal discoid atelectasis at the left base. Lungs  otherwise appear clear. No effusion or pneumothorax is seen.           Impression:       Mild bilateral discoid atelectasis.     DICTATED:     04/15/2018  EDITED/ls :     04/15/2018      This report was finalized on 4/16/2018 12:04 AM by DR. Thony Paredes MD.             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 Cx with Staph aureus susc pending  --Fever, hectic--resolved  --Leukocytosis/neutrophilia, ongoing  --Lactic acidosis, resolved  --Elevated procalcitonin  --Hyponatremia/hypokalemia  --JOHN, resolved.  --Prostate cancer s/p XRT, on Zytiga/pred  --Elevated Hgb A1C  --HTN  --CAD/h/o CABG     PLAN/RECOMMENDATIONS:   --Monitor cultures, labs, KAREN  --Continue Vancomycin for now  --Continue  Rocephin increase to 2 GM IV daily  --Continue wound care as per Dr. Hameed  F/u cultures     Darryn Orlando MD saw and examined patient, verified hx and PE, read all radiographic studies, reviewed labs and micro data, and formulated dx, plan for treatment and all medical decision making.       SHANE SoC for Darryn Orlando MD    I have seen and examined patient and agree with above    JERALD So  4/16/2018  3:16 PM

## 2018-04-16 NOTE — PROGRESS NOTES
Discharge Planning Assessment  [facility]     Patient Name: Dusty Manzanares  MRN: [id number]  Today's Date: 4/16/2018    Admit Date: 4/15/2018          Discharge Needs Assessment     Row Name 04/16/18 1046       Living Environment    Lives With spouse    Current Living Arrangements home/apartment/condo    Living Arrangement Comments Lives with spouse.       Discharge Needs Assessment    Equipment Currently Used at Home none    Equipment Needed After Discharge none    Discharge Coordination/Progress No HH involved.            Discharge Plan     Row Name 04/16/18 1047       Plan    Plan Home at DC.    Patient/Family in Agreement with Plan yes    Plan Comments I spoke with the pts spouse, pt sleeping. No DC needs at this time.    Row Name 04/16/18 0839       Plan    Final Discharge Disposition Code 01 - home or self-care        Destination     No service coordination in this encounter.      Durable Medical Equipment     No service coordination in this encounter.      Dialysis/Infusion     No service coordination in this encounter.      Home Medical Care     No service coordination in this encounter.      Social Care     No service coordination in this encounter.        Expected Discharge Date and Time     Expected Discharge Date Expected Discharge Time    Apr 18, 2018               Demographic Summary    No documentation.           Functional Status     Row Name 04/16/18 1046       Functional Status    Usual Activity Tolerance excellent       Functional Status, IADL    Medications independent    Meal Preparation independent    Housekeeping independent    Laundry independent    Shopping independent            Psychosocial    No documentation.           Abuse/Neglect    No documentation.           Legal    No documentation.           Substance Abuse    No documentation.           Patient Forms    No documentation.         Pennie Gunderson RN

## 2018-04-16 NOTE — PLAN OF CARE
Problem: Skin Injury Risk (Adult)  Goal: Identify Related Risk Factors and Signs and Symptoms  Outcome: Ongoing (interventions implemented as appropriate)    Goal: Skin Health and Integrity  Outcome: Ongoing (interventions implemented as appropriate)

## 2018-04-17 VITALS
HEIGHT: 68 IN | RESPIRATION RATE: 18 BRPM | WEIGHT: 180.4 LBS | BODY MASS INDEX: 27.34 KG/M2 | DIASTOLIC BLOOD PRESSURE: 68 MMHG | HEART RATE: 96 BPM | OXYGEN SATURATION: 90 % | TEMPERATURE: 98.4 F | SYSTOLIC BLOOD PRESSURE: 119 MMHG

## 2018-04-17 LAB
BACTERIA SPEC AEROBE CULT: ABNORMAL
BACTERIA SPEC AEROBE CULT: ABNORMAL
GRAM STN SPEC: ABNORMAL
GRAM STN SPEC: ABNORMAL
POTASSIUM BLD-SCNC: 4.4 MMOL/L (ref 3.5–5.5)

## 2018-04-17 PROCEDURE — 97116 GAIT TRAINING THERAPY: CPT

## 2018-04-17 PROCEDURE — 25010000002 HEPARIN (PORCINE) PER 1000 UNITS: Performed by: NURSE PRACTITIONER

## 2018-04-17 PROCEDURE — 99239 HOSP IP/OBS DSCHRG MGMT >30: CPT | Performed by: INTERNAL MEDICINE

## 2018-04-17 PROCEDURE — 63710000001 PREDNISONE PER 5 MG: Performed by: INTERNAL MEDICINE

## 2018-04-17 PROCEDURE — 25010000002 VANCOMYCIN

## 2018-04-17 RX ORDER — CEPHALEXIN 500 MG/1
500 CAPSULE ORAL 4 TIMES DAILY
Start: 2018-04-17 | End: 2018-05-21

## 2018-04-17 RX ADMIN — HEPARIN SODIUM 5000 UNITS: 5000 INJECTION, SOLUTION INTRAVENOUS; SUBCUTANEOUS at 08:47

## 2018-04-17 RX ADMIN — ASPIRIN 81 MG: 81 TABLET, COATED ORAL at 08:47

## 2018-04-17 RX ADMIN — PANTOPRAZOLE SODIUM 40 MG: 40 TABLET, DELAYED RELEASE ORAL at 05:12

## 2018-04-17 RX ADMIN — Medication 1 CAPSULE: at 08:47

## 2018-04-17 RX ADMIN — VANCOMYCIN HYDROCHLORIDE 1250 MG: 10 INJECTION, POWDER, LYOPHILIZED, FOR SOLUTION INTRAVENOUS at 05:12

## 2018-04-17 RX ADMIN — MUPIROCIN 1 APPLICATION: 20 OINTMENT TOPICAL at 08:47

## 2018-04-17 RX ADMIN — TIMOLOL MALEATE 1 DROP: 5 SOLUTION/ DROPS OPHTHALMIC at 08:47

## 2018-04-17 RX ADMIN — FLUTICASONE PROPIONATE 2 SPRAY: 50 SPRAY, METERED NASAL at 08:47

## 2018-04-17 RX ADMIN — PREDNISONE 5 MG: 5 TABLET ORAL at 08:47

## 2018-04-17 NOTE — DISCHARGE SUMMARY
[facility] Hospital Medicine Services  DISCHARGE SUMMARY    Patient Name: Dusty Manzanares  : 1945  MRN: [id number]    Date of Admission: 4/15/2018  Date of Discharge:  18  Primary Care Physician: Shalom Holt MD    Consults     Date and Time Order Name Status Description    4/15/2018 0428 Inpatient Infectious Diseases Consult Completed         Hospital Course     Presenting Problem:   Sepsis [A41.9]  Sepsis [A41.9]    Active Hospital Problems (** Indicates Principal Problem)    Diagnosis Date Noted   • **Sepsis [A41.9] 04/15/2018     Priority: Medium   • Abscess or cellulitis of scalp [L03.811] 2018     Priority: Medium   • HTN (hypertension) [I10] 04/15/2018   • CAD (coronary artery disease) [I25.10] 04/15/2018   • 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          Hospital Course:  Dusty Manzanares is a 73 y.o. male who presented to an outside hospital with complaints of fever and weakness.  On  he had a sarcoma removed from his scalp by Dr. Khoi Hameed with dermatology in the next day came back for closure.  Was prescribed oral doxycycline afterwards the patient said he had nausea vomiting and was unable to take it.  At the outside hospital he was noted to have some drainage from the scalp wound associated with a fever, or blood cell count of 13, and lactic acid of 3.3.  He was hypotensive and required 2 L bolus.  He did meet sepsis criteria and was transferred to [facility] for further evaluation.  On arrival here he continued to be volume resuscitated with good results.  Culture obtained from the wound up growing MSSA.  Dr. Orlando from infectious disease was consulted and antibiotics plan at the time of discharge and be  oral Keflex for 2 weeks.  Dr. Hameed did see the patient the hospital to assist with wound care and dressing changes.  He'll follow up with him next week.  He is also scheduled to see Dr. Salazar from radiation oncology soon.  Dr. Steele see him back in the office in one to 2 weeks as well.  The day of discharge she is very close to baseline and anxious to get home.           Day of Discharge     HPI:   No problems overnight.  Drainage from wound is improving.  No issues and ready for home/.    Review of Systems  Gen- No fevers, chills  CV- No chest pain, palpitations  Resp- No cough, dyspnea  GI- No N/V/D, abd pain      Otherwise ROS is negative except as mentioned in the HPI.    Vital Signs:   Temp:  [97.9 °F (36.6 °C)-98.4 °F (36.9 °C)] 98.4 °F (36.9 °C)  Heart Rate:  [] 96  Resp:  [18] 18  BP: (119-163)/() 119/68     Physical Exam:  Constitutional: No acute distress, awake, alert  HENT: NCAT, mucous membranes moist.  Sutures intact, small amount of expressible drainage.  Respiratory: Clear to auscultation bilaterally, respiratory effort normal   Cardiovascular: RRR, no murmurs, rubs, or gallops, palpable pedal pulses bilaterally  Gastrointestinal: Positive bowel sounds, soft, nontender, nondistended  Musculoskeletal: No bilateral ankle edema  Psychiatric: Appropriate affect, cooperative  Neurologic: Oriented x 3, strength symmetric in all extremities, Cranial Nerves grossly intact to confrontation, speech clear  Skin: No rashes      Pertinent  and/or Most Recent Results       Results from last 7 days  Lab Units 04/16/18  2350 04/16/18  0535 04/15/18  0521 04/15/18  0519   WBC 10*3/mm3  --   --   --  12.58*   HEMOGLOBIN g/dL  --   --   --  11.1*   HEMATOCRIT %  --   --   --  34.0*   PLATELETS 10*3/mm3  --   --   --  215   SODIUM mmol/L  --  135 130*  --    POTASSIUM mmol/L 4.4 3.0* 3.1*  --    CHLORIDE mmol/L  --  102 98*  --    CO2 mmol/L  --  26.0 25.0  --    BUN mg/dL  --  10 17  --     CREATININE mg/dL  --  0.60 0.80  --    GLUCOSE mg/dL  --  96 133*  --    CALCIUM mg/dL  --  8.3* 8.7  --        Results from last 7 days  Lab Units 04/15/18  0521 04/15/18  0513   BILIRUBIN mg/dL 0.8  --    ALK PHOS U/L 83  --    ALT (SGPT) U/L 11  --    AST (SGOT) U/L 11  --    PROTIME Seconds  --  10.5   INR   --  1.00   APTT seconds  --  30.9           Invalid input(s): TG, LDLCALC, LDLREALC    Results from last 7 days  Lab Units 04/15/18  0521 04/15/18  0519   TSH mIU/mL 2.081  --    HEMOGLOBIN A1C %  --  6.60*   BNP pg/mL  --  83.0     Brief Urine Lab Results     None          Microbiology Results Abnormal     Procedure Component Value - Date/Time    Wound Culture - Wound, Scalp [[id number]]  (Abnormal)  (Susceptibility) Collected:  04/15/18 1035    Lab Status:  Final result Specimen:  Wound from Scalp Updated:  04/17/18 1212     Wound Culture --      Heavy growth (4+) Staphylococcus aureus (A)     Gram Stain Result No WBCs seen      Occasional Gram positive cocci in pairs    Susceptibility      Staphylococcus aureus     TIMOTEO     Ceftriaxone <=8 ug/ml Susceptible     Clindamycin <=0.5 ug/ml Susceptible     Daptomycin <=0.5 ug/ml Susceptible     Erythromycin <=0.5 ug/ml Susceptible     Gentamicin <=4 ug/ml Susceptible     Levofloxacin <=1 ug/ml Susceptible  [1]      Linezolid 2 ug/ml Susceptible     Oxacillin <=0.25 ug/ml Susceptible     Penicillin G >8 ug/ml Resistant     Quinupristin + Dalfopristin <=0.5 ug/ml Susceptible     Rifampin <=1 ug/ml Susceptible     Tetracycline <=4 ug/ml Susceptible     Trimethoprim + Sulfamethoxazole <=0.5/9.5 ug/ml Susceptible     Vancomycin 1 ug/ml Susceptible            [1]   Staphylococcus species may develop resistance during prolonged therapy with quinolones.  Isolates that are initially susceptible may become resistant within three to four days after initiation of therapy. Testing of repeat isolates may be warranted.                 Blood Culture - Blood, [[id number]]   (Normal) Collected:  04/15/18 0513    Lab Status:  Preliminary result Specimen:  Blood from Hand, Left Updated:  04/17/18 0630     Blood Culture No growth at 2 days    Blood Culture - Blood, [[id number]]  (Normal) Collected:  04/15/18 0513    Lab Status:  Preliminary result Specimen:  Blood from Arm, Left Updated:  04/17/18 0630     Blood Culture No growth at 2 days          Imaging Results (all)     Procedure Component Value Units Date/Time    XR Chest 1 View [[id number]] Collected:  04/15/18 0923     Updated:  04/16/18 0006    Narrative:          EXAMINATION: XR CHEST, SINGLE VIEW - 04/15/2018     INDICATION: Sepsis, hypoxia.      COMPARISON: None.     FINDINGS: Sternotomy wires are noted. The heart and vasculature appear  normal in size. There is mild focal discoid atelectasis in the right  midlung and perhaps minimal discoid atelectasis at the left base. Lungs  otherwise appear clear. No effusion or pneumothorax is seen.           Impression:       Mild bilateral discoid atelectasis.     DICTATED:     04/15/2018  EDITED/ls :     04/15/2018      This report was finalized on 4/16/2018 12:04 AM by DR. Thony Paredes MD.                         Order Current Status    Blood Culture - Blood, Preliminary result    Blood Culture - Blood, Preliminary result        Discharge Details      Dusty Manzanares   Home Medication Instructions YOHANA:[id number]    Printed on:04/17/18 1412   Medication Information                      abiraterone (ZYTIGA) 250 MG chemo tablet  Take 1,000 mg by mouth Every Night.             aspirin 81 MG EC tablet  Take 81 mg by mouth Every Morning.             cephalexin (KEFLEX) 500 MG capsule  Take 1 capsule by mouth 4 (Four) Times a Day.             fluticasone (FLONASE) 50 MCG/ACT nasal spray  2 sprays into each nostril Daily.             lansoprazole (PREVACID) 30 MG capsule  Take 30 mg by mouth Every Morning.             losartan 50 MG tablet 2 tablet, hydrochlorothiazide 25 MG tablet 1 tablet  Take   by mouth Every Morning.             predniSONE (DELTASONE) 5 MG tablet  Take 5 mg by mouth 2 (Two) Times a Day.             simvastatin (ZOCOR) 40 MG tablet  Take 40 mg by mouth Every Night.             Timolol Maleate PF 0.5 % solution  Apply  to eye Every Morning.                   Discharge Disposition:  Home or Self Care    Discharge Diet:  Diet Instructions     Diet: Regular       Discharge Diet:  Regular          Discharge Activity:   Activity Instructions     Activity as Tolerated             Future Appointments  Date Time Provider Department Center   5/2/2018 8:30 AM MD CHANTEL Jones [facility] None       Additional Instructions for the Follow-ups that You Need to Schedule     Discharge Follow-up with Specified Provider: He will schedule with Dr. Hameed    As directed      To:  He will schedule with Dr. Hameed               Time Spent on Discharge:  35 minutes    Electronically signed by Yaya Becker MD, 04/17/18, 2:12 PM.

## 2018-04-17 NOTE — PROGRESS NOTES
[facility] Progress Note    Admission Date: 4/15/2018    Dusty Manzanares  1945  [id number]    Date: 4/17/2018    Meds:    Anti-Infectives     Ordered     Dose/Rate Route Frequency Start Stop    04/16/18 1523  cefTRIAXone (ROCEPHIN) IVPB 2 g     Comments:  Monitor for any KAREN   Ordering Provider:  JERALD So    2 g  100 mL/hr over 30 Minutes Intravenous Every 24 Hours 04/17/18 1400 04/25/18 1344    04/15/18 0618  vancomycin 1250 mg/250 mL 0.9% NS IVPB ([facility])     Ordering Provider:  Dusty Gonzalez RPH    15 mg/kg × 81.8 kg  over 90 Minutes Intravenous Every 12 Hours 04/15/18 1800 04/22/18 1759    04/15/18 0413  vancomycin in dextrose 5% 150 mL (VANCOCIN) IVPB 750 mg     Ordering Provider:  Dusty Gonzalez RPH    750 mg  over 60 Minutes Intravenous Once 04/15/18 0445 04/15/18 0700    04/15/18 0403  Pharmacy to dose vancomycin     Ordering Provider:  Nova Tinajero, AUDREY     Does not apply Continuous PRN 04/15/18 0402 04/25/18 0401          CC:  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 male with h/o CAD/CABG, HTN, and Prostate cancer/Zytiga with prednisone/XRT 2009 who presented to [facility] on 4/14/18 for fever with decreased oral intake, weakness, nausea and vomiting that started the day before.  He had a sarcoma excision from scalp on 4/11/18 with delayed closure on 4/12/18 by Dr. [facility]ameed of [facility].  He does complain of right facial swelling and redness.  At [facility], his Tmax is 101.5, lactic acid 3.1, sCr 1.33, WBC 13,000 with 88% neutrophils. He was given Vancomycin and Aztreonam.  He was transferred to [facility] for further medical management.  Work up here shows Hgb A1C 6.6, sed rate 64, CRP 22.44, PCT 0.75, WBC 13,000 with 90% neutrophils, sCr 0.8, and lactic acid 1.5.  Blood and scalp wound are pending. He was started on Azactam and Vancomycin.  ID was asked to evaluate and manage his antibiotic  therapy.    18: Ambulatory.  Still with periorbital swelling.  Saw Dr. Hameed yesterday afternoon and the wound was cleaned up with some purulence from middle of wound.  Sent for culture and positive for Staph aureus with susc pending.     18; doing well; no complaints, denies diarrhea, rash, sore throat, no scalp pain  PE:   Vital Signs  Temp (24hrs), Av.2 °F (36.8 °C), Min:97.9 °F (36.6 °C), Max:98.4 °F (36.9 °C)    Temp  Min: 97.9 °F (36.6 °C)  Max: 98.4 °F (36.9 °C)  BP  Min: 119/68  Max: 163/107  Pulse  Min: 70  Max: 109  Resp  Min: 18  Max: 18  SpO2  Min: 90 %  Max: 93 %    GENERAL: Awake and alert, in no acute distress.   HEENT: Normocephalic, atraumatic.  PERRL. EOMI. No conjunctival injection. No icterus. Oropharynx clear without evidence of thrush or exudate. Head wound with topical ointment , no erythema, no drainage  HEART: RRR; No murmur, rubs, gallops.   LUNGS: Clear to auscultation bilaterally without wheezing, rales, rhonchi.  ABDOMEN: Soft, nontender, nondistended. Positive bowel sounds.   EXT:  No cyanosis, clubbing or edema. No cord.    MSK: FROM without joint effusions noted arms/legs.    SKIN: Warm and dry without cutaneous eruptions on Inspection/palpation.  Scalp wound with drainage at middle of wound, remaining wound is c/d/i with sutures. With improving surrounding erythema.       Laboratory Data      Results from last 7 days  Lab Units 04/15/18  0519   WBC 10*3/mm3 12.58*   HEMOGLOBIN g/dL 11.1*   HEMATOCRIT % 34.0*   PLATELETS 10*3/mm3 215       Results from last 7 days  Lab Units 18  2350 18  0535   SODIUM mmol/L  --  135   POTASSIUM mmol/L 4.4 3.0*   CHLORIDE mmol/L  --  102   CO2 mmol/L  --  26.0   BUN mg/dL  --  10   CREATININE mg/dL  --  0.60   GLUCOSE mg/dL  --  96   CALCIUM mg/dL  --  8.3*       Results from last 7 days  Lab Units 04/15/18  0521   ALK PHOS U/L 83   BILIRUBIN mg/dL 0.8   ALT (SGPT) U/L 11   AST (SGOT) U/L 11       Results from last 7  days  Lab Units 04/15/18  0519   SED RATE mm/hr 64*       Results from last 7 days  Lab Units 04/15/18  0521   CRP mg/dL 22.44*       Results from last 7 days  Lab Units 04/15/18  0521   LACTATE mmol/L 1.5           Results from last 7 days  Lab Units 04/16/18  1820   VANCOMYCIN TR mcg/mL 14.80     Estimated Creatinine Clearance: 98 mL/min (by C-G formula based on SCr of 0.6 mg/dL).    Microbiology:  Blood Culture   Date Value Ref Range Status   04/15/2018 No growth at 24 hours  Preliminary   04/15/2018 No growth at 24 hours  Preliminary                       Wound Culture   Date Value Ref Range Status   04/15/2018 Heavy growth (4+) Staphylococcus aureus (A)  Preliminary       Radiology:  Imaging Results (last 72 hours)     Procedure Component Value Units Date/Time    XR Chest 1 View [[id number]] Collected:  04/15/18 0923     Updated:  04/16/18 0006    Narrative:          EXAMINATION: XR CHEST, SINGLE VIEW - 04/15/2018     INDICATION: Sepsis, hypoxia.      COMPARISON: None.     FINDINGS: Sternotomy wires are noted. The heart and vasculature appear  normal in size. There is mild focal discoid atelectasis in the right  midlung and perhaps minimal discoid atelectasis at the left base. Lungs  otherwise appear clear. No effusion or pneumothorax is seen.           Impression:       Mild bilateral discoid atelectasis.     DICTATED:     04/15/2018  EDITED/ls :     04/15/2018      This report was finalized on 4/16/2018 12:04 AM by DR. Thony Paredes MD.             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 Cx with Staph aureus susc pending  --Fever, hectic--resolved  --Leukocytosis/neutrophilia, ongoing  --Lactic acidosis, resolved  --Elevated  procalcitonin  --Hyponatremia/hypokalemia  --JOHN, resolved.  --Prostate cancer s/p XRT, on Zytiga/pred  --Elevated Hgb A1C  --HTN  --CAD/h/o CABG  --pcn allergy hives     PLAN/RECOMMENDATIONS:   --Monitor cultures, labs, KAREN  --disContinue Vancomycin for now  --change rocephin to po keflex 500 mg qid x 10 days  --f/u with  Dr. Hameed at end of week  F/u with me next week    D/w family,   D/w Dr. Hameed, Dr. Becker  Po abx  Take live culture yogurt once daily  Dp/cm time 15 minutes       4/17/2018  1:53 PM

## 2018-04-17 NOTE — THERAPY TREATMENT NOTE
Acute Care - Physical Therapy Treatment Note  [facility]     Patient Name: Dusty Manzanares  : 1945  MRN: [id number]  Today's Date: 2018  Onset of Illness/Injury or Date of Surgery: 04/15/18  Date of Referral to PT: 04/15/18  Referring Physician: Tanvi VENTURA    Admit Date: 4/15/2018    Visit Dx:    ICD-10-CM ICD-9-CM   1. Impaired functional mobility, balance, gait, and endurance Z74.09 V49.89     Patient Active Problem List   Diagnosis   • HTN (hypertension)   • CAD (coronary artery disease)   • Sepsis   • Sarcoma   • Prostate cancer   • Hyperglycemia   • Hyponatremia   • Hypokalemia   • Abscess or cellulitis of scalp       Therapy Treatment          Rehabilitation Treatment Summary     Row Name 18 0745             Treatment Time/Intention    Discipline physical therapy assistant  -AS      Document Type therapy note (daily note)  -AS      Subjective Information no complaints  -AS      Mode of Treatment physical therapy  -AS      Patient/Family Observations family present  -AS      Patient Effort excellent  -AS      Recorded by [AS] Faviola White, [facility] 18 0855 18 0855      Row Name 18 0745             Cognitive Assessment/Intervention- PT/OT    Affect/Mental Status (Cognitive) WFL  -AS      Orientation Status (Cognition) oriented x 4  -AS      Follows Commands (Cognition) WFL  -AS      Personal Safety Interventions gait belt;nonskid shoes/slippers when out of bed;fall prevention program maintained  -AS      Recorded by [AS] Faviola White PTA 18 0855 18 0855      Row Name 18 0745             Bed Mobility Assessment/Treatment    Supine-Sit [place] (Bed Mobility) supervision  -AS      Sit-Supine [place] (Bed Mobility) supervision  -AS      Comment (Bed Mobility) patient demonstrated safe technique  -AS      Recorded by [AS] Faviola White, JUAREZ 18 0855 18 0855      Row Name 18 0745             Transfer Assessment/Treatment     Sit-Stand [place] (Transfers) contact guard  -AS      Stand-Sit [place] (Transfers) contact guard  -AS      Recorded by [AS] Faviola White, [facility] 04/17/18 0855 04/17/18 0855      Row Name 04/17/18 0745             Sit-Stand Transfer    Assistive Device (Sit-Stand Transfers) other (see comments)   none  -AS      Recorded by [AS] Faviolabernarda White, [facility] 04/17/18 0855 04/17/18 0855      Row Name 04/17/18 0745             Gait/Stairs Assessment/Training    Gait/Stairs Assessment/Training gait/ambulation assistive device  -AS      [place] Level (Gait) verbal cues;contact guard  -AS      Assistive Device (Gait) walker, front-wheeled  -AS      Distance in Feet (Gait) 350  -AS      Pattern (Gait) step-through  -AS      Comment (Gait/Stairs) patient progressing well towards all goals, no LOB and did not require AD  -AS      Recorded by [AS] Faviola Jaclyn White, [facility] 04/17/18 0855 04/17/18 0855      Row Name 04/17/18 0745             Positioning and Restraints    Pre-Treatment Position in bed  -AS      Post Treatment Position bed  -AS      In Bed supine;call light within reach;encouraged to call for assist;with family/caregiver  -AS      Recorded by [AS] Faviola White, [facility] 04/17/18 0855 04/17/18 0855      Row Name 04/17/18 0745             Pain Scale: Numbers Pre/Post-Treatment    Pain Scale: Numbers, Pretreatment 0/10 - no pain  -AS      Pain Scale: Numbers, Post-Treatment 0/10 - no pain  -AS      Recorded by [AS] Faviolabernarda White, [facility] 04/17/18 0855 04/17/18 0855      Row Name                Wound 04/15/18 0800 Bilateral upper head incision    Wound - Properties Group Date first assessed: 04/15/18 [JH] Time first assessed: 0800 [JH] Present On Admission : yes [JH] Side: Bilateral [JH] Orientation: upper [JH] Location: head [JH] Type: incision [JH] Additional Comments: per MD, cancer removed before admission [JH] Recorded by:  [JH] Nesha Omer RN 04/16/18 1021 04/16/18 1021      User Key  (r) =  Recorded By, (t) = Taken By, (c) = Cosigned By    Initials Name Effective Dates Discipline    AS Faviola White, JUAREZ 06/22/15 -  PT     Nesha Omer RN 06/16/16 -  Nurse          Wound 04/15/18 0800 Bilateral upper head incision (Active)   Dressing Appearance dried drainage 4/16/2018 10:00 PM   Closure Sutures 4/16/2018 10:00 PM   Base scab;moist 4/16/2018 10:00 PM   Periwound intact 4/16/2018 10:00 PM   Drainage Characteristics/Odor creamy;tan 4/16/2018  5:20 PM   Drainage Amount none 4/16/2018 10:00 PM   Care, Wound cleansed with;sterile normal saline 4/16/2018 10:00 PM   Dressing Care, Wound dressing changed;gauze 4/16/2018 10:00 PM             Physical Therapy Education     Title: PT OT SLP Therapies (Active)     Topic: Physical Therapy (Active)     Point: Mobility training (Active)    Learning Progress Summary     Learner Status Readiness Method Response Comment Documented by    Patient Active Acceptance E NR  AS 04/17/18 0855     Done Acceptance E VU  ML 04/16/18 0347     Done Acceptance E VU discussed walking with family/nursing in [place]  04/15/18 1414          Point: Home exercise program (Active)    Learning Progress Summary     Learner Status Readiness Method Response Comment Documented by    Patient Active Acceptance E NR  AS 04/17/18 0855     Done Acceptance E VU  ML 04/16/18 0347     Done Acceptance E VU discussed walking with family/nursing in [place]  04/15/18 1414          Point: Body mechanics (Active)    Learning Progress Summary     Learner Status Readiness Method Response Comment Documented by    Patient Active Acceptance E NR  AS 04/17/18 0855     Done Acceptance E VU  ML 04/16/18 0347     Done Acceptance E VU discussed walking with family/nursing in [place]  04/15/18 1414          Point: Precautions (Active)    Learning Progress Summary     Learner Status Readiness Method Response Comment Documented by    Patient Active Acceptance E NR  AS 04/17/18 0855     Done Acceptance E VU  ML  04/16/18 0347     Done Acceptance E VERITO discussed walking with family/nursing in [place]  04/15/18 1414                      User Key     Initials Effective Dates Name Provider Type Discipline     06/19/15 -  Dipti England, PT Physical Therapist PT    AS 06/22/15 -  Faviola White PTA Physical Therapy Assistant PT     06/16/16 -  Cally Hernandez, RN Registered Nurse Nurse                    PT Recommendation and Plan     Plan of Care Reviewed With: patient  Progress: improving  Outcome Summary: patient up ambulating in hallway with no use of AD, no LOB noticed. Good safet awareness.          Outcome Measures     Row Name 04/17/18 0745 04/15/18 1350          How much help from another person do you currently need...    Turning from your back to your side while in flat bed without using bedrails? 4  -AS 4  -LF     Moving from lying on back to sitting on the side of a flat bed without bedrails? 4  -AS 3  -LF     Moving to and from a bed to a chair (including a wheelchair)? 3  -AS 4  -LF     Standing up from a chair using your arms (e.g., wheelchair, bedside chair)? 3  -AS 3  -LF     Climbing 3-5 steps with a railing? 3  -AS 3  -LF     To walk in hospital room? 3  -AS 4  -LF     AM-PAC 6 Clicks Score 20  -AS 21  -LF        Functional Assessment    Outcome Measure Options AM-PAC 6 Clicks Basic Mobility (PT)  -AS AM-PAC 6 Clicks Basic Mobility (PT)  -LF       User Key  (r) = Recorded By, (t) = Taken By, (c) = Cosigned By    Initials Name Provider Type    LF Dipti England, PT Physical Therapist    AS Faviola White, JUAREZ Physical Therapy Assistant           Time Calculation:         PT Charges     Row Name 04/17/18 0857             Time Calculation    Start Time 0745  -AS      PT Received On 04/17/18  -AS      PT Goal Re-Cert Due Date 04/20/18  -AS         Time Calculation- PT    Total Timed Code Minutes- PT 15 minute(s)  -AS        User Key  (r) = Recorded By, (t) = Taken By, (c) = Cosigned By    Initials Name  Provider Type    AS Faviola White PTA Physical Therapy Assistant          Therapy Charges for Today     Code Description Service Date Service Provider Modifiers Qty    [id number] HC GAIT TRAINING EA 15 MIN 4/17/2018 Faviola White PTA GP 1          PT G-Codes  Outcome Measure Options: AM-PAC 6 Clicks Basic Mobility (PT)    Faviola White PTA  4/17/2018

## 2018-04-17 NOTE — PLAN OF CARE
Problem: Fall Risk (Adult)  Goal: Identify Related Risk Factors and Signs and Symptoms  Outcome: Ongoing (interventions implemented as appropriate)   04/17/18 0438   Fall Risk (Adult)   Related Risk Factors (Fall Risk) age-related changes;environment unfamiliar   Signs and Symptoms (Fall Risk) presence of risk factors     Goal: Absence of Fall  Outcome: Ongoing (interventions implemented as appropriate)   04/17/18 0438   Fall Risk (Adult)   Absence of Fall making progress toward outcome       Problem: Patient Care Overview  Goal: Plan of Care Review  Outcome: Ongoing (interventions implemented as appropriate)   04/17/18 0438   Coping/Psychosocial   Plan of Care Reviewed With patient   OTHER   Outcome Summary VSS, denies discomfort, wound clean without drainage.   Plan of Care Review   Progress improving       Problem: Skin Injury Risk (Adult)  Goal: Identify Related Risk Factors and Signs and Symptoms  Outcome: Ongoing (interventions implemented as appropriate)   04/17/18 0438   Skin Injury Risk (Adult)   Related Risk Factors (Skin Injury Risk) infection     Goal: Skin Health and Integrity  Outcome: Ongoing (interventions implemented as appropriate)   04/17/18 0438   Skin Injury Risk (Adult)   Skin Health and Integrity making progress toward outcome       Problem: Infection, Risk/Actual (Adult)  Goal: Identify Related Risk Factors and Signs and Symptoms  Outcome: Ongoing (interventions implemented as appropriate)   04/17/18 0438   Infection, Risk/Actual (Adult)   Related Risk Factors (Infection, Risk/Actual) skin integrity impairment   Signs and Symptoms (Infection, Risk/Actual) lab value changes     Goal: Infection Prevention/Resolution  Outcome: Ongoing (interventions implemented as appropriate)   04/17/18 0438   Infection, Risk/Actual (Adult)   Infection Prevention/Resolution making progress toward outcome

## 2018-04-17 NOTE — PLAN OF CARE
Problem: Patient Care Overview  Goal: Plan of Care Review  Outcome: Ongoing (interventions implemented as appropriate)   04/17/18 0856   Coping/Psychosocial   Plan of Care Reviewed With patient   OTHER   Outcome Summary patient up ambulating in hallway with no use of AD, no LOB noticed. Good safet awareness.   Plan of Care Review   Progress improving

## 2018-04-20 LAB
BACTERIA SPEC AEROBE CULT: NORMAL
BACTERIA SPEC AEROBE CULT: NORMAL

## 2018-05-02 ENCOUNTER — HOSPITAL ENCOUNTER (OUTPATIENT)
Dept: RADIATION ONCOLOGY | Facility: HOSPITAL | Age: 73
Setting detail: RADIATION/ONCOLOGY SERIES
Discharge: HOME | End: 2018-05-02

## 2018-05-02 ENCOUNTER — OFFICE VISIT (OUTPATIENT)
Dept: RADIATION ONCOLOGY | Facility: HOSPITAL | Age: 73
End: 2018-05-02

## 2018-05-02 VITALS
OXYGEN SATURATION: 99 % | RESPIRATION RATE: 18 BRPM | HEART RATE: 89 BPM | DIASTOLIC BLOOD PRESSURE: 79 MMHG | SYSTOLIC BLOOD PRESSURE: 148 MMHG | WEIGHT: 171.9 LBS | BODY MASS INDEX: 26.14 KG/M2 | TEMPERATURE: 97.3 F

## 2018-05-02 DIAGNOSIS — C44.40 MALIGNANT NEOPLASM OF SCALP: ICD-10-CM

## 2018-05-02 PROCEDURE — G0463 HOSPITAL OUTPT CLINIC VISIT: HCPCS | Performed by: RADIOLOGY

## 2018-05-02 RX ORDER — ERYTHROMYCIN 5 MG/G
OINTMENT OPHTHALMIC
Refills: 1 | COMMUNITY
Start: 2018-04-23 | End: 2018-06-11

## 2018-05-02 RX ORDER — TIMOLOL MALEATE 5 MG/ML
SOLUTION/ DROPS OPHTHALMIC
Refills: 3 | COMMUNITY
Start: 2018-03-12

## 2018-05-02 RX ORDER — LOSARTAN POTASSIUM AND HYDROCHLOROTHIAZIDE 25; 100 MG/1; MG/1
0.5 TABLET ORAL
Refills: 1 | COMMUNITY
Start: 2018-04-23 | End: 2021-07-29

## 2018-05-02 RX ORDER — CYCLOPENTOLATE HYDROCHLORIDE 10 MG/ML
SOLUTION/ DROPS OPHTHALMIC
Refills: 0 | COMMUNITY
Start: 2018-04-23 | End: 2018-06-11

## 2018-05-02 NOTE — PROGRESS NOTES
CONSULTATION NOTE      :                                                          1945  DATE OF CONSULTATION:                       2018   REQUESTING PHYSICIAN:                   Khoi Hameed, *  REASON FOR CONSULTATION:           undifferentiated pleomorphic sarcoma of scalp              BRIEF HISTORY:  The patient is a very pleasant 73 y.o. male  with recently resected tumor of the skin on the scalp vertex.  He presented with a rapidly growing 3 x 2.7 cm mass.  Biopsy suggested atypical fibroxanthoma.  He was referred for Mohs surgery which cleared tumor after 2 stages.  Tumor was found to extend deep into the periosteum but with negative margins.  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  He was recently hospitalized for secondary staph infection which is clearing with antibiotics.    He is also being treated for metastatic prostate cancer.  He was originally diagnosed in  and treated with external radiotherapy to the pelvis, 43 fractions.  He will subsequent found to have recurrent/metastatic tumor and has been on androgen suppression with LHRH agonist.  Dr. Robbin Gavin is currently treating him with Zytiga and prednisone for the past 2 years.  PSA has been decreasing slightly but is still markedly elevated with value of 17.8 ng/ml.      Allergies   Allergen Reactions   • Augmentin [Amoxicillin-Pot Clavulanate] Hives   • Sulfa Antibiotics Rash     Red like a sunburn all over       Social History     Social History   • Marital status:      Social History Main Topics   • Smoking status: Never Smoker   • Smokeless tobacco: Never Used   • Alcohol use No   • Drug use: No   • Sexual activity: Defer     Other Topics Concern   • Not on file     Social History Narrative    Mr. Manzanares is a 73 year old white male. They live in [facility] [place]. He has two sons. He is retired from the [employer]. He does not have an advanced directive.       Past Medical History:    Diagnosis Date   • Basal cell carcinoma    • CAD (coronary artery disease) 4/15/2018   • Cataracts, both eyes    • HTN (hypertension) 4/15/2018   • Hyperglycemia 4/15/2018   • Hypokalemia 4/15/2018   • Hyponatremia 4/15/2018   • Kidney stones    • Myocardial infarction     2010, CABG x5   • Prostate CA     had spread outside of prostate when found, radiation to area only. on oral zytiga. still present. no surgical intervention.   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   • Sepsis 4/15/2018       family history includes Heart attack in his father; No Known Problems in his son; Stroke in his mother and sister.     Past Surgical History:   Procedure Laterality Date   • CARDIAC CATHETERIZATION      2010; s/p CABG after   • COLONOSCOPY  2012    Dr. Lyle- Greg Youssef   • CORONARY ARTERY BYPASS GRAFT  2010    x5 Dr. Simms   • ENDOSCOPY      2006, dr burroughs, for dysphagia, pathology in chart   • KIDNEY STONE SURGERY     • MOHS SURGERY  2017 & 4/11/18    Dr. Zachary Bentley ( 2017- plastic surgery) Dr. Hameed   • PROSTATE BIOPSY      Dr. Leonidas Bro   • SKIN CANCER EXCISION      2006, 2017,2018   • TRIGGER FINGER RELEASE      x2, one on each hand, pinky fingers        Review of Systems   Constitutional: Positive for fatigue.   HENT:   Positive for hearing loss (wearing bilateral hearing aids) and trouble swallowing.    Gastrointestinal: Positive for constipation.   Skin: Positive for wound (stitches intact to top of head with dressing  noted).   Hematological: Bruises/bleeds easily.   All other systems reviewed and are negative.          Objective   VITAL SIGNS:   Vitals:    05/02/18 0900   BP: 148/79   Pulse: 89   Resp: 18   Temp: 97.3 °F (36.3 °C)   TempSrc: Temporal Artery    SpO2: 99%   Weight: 78 kg (171 lb 14.4 oz)   PainSc: 0-No pain        Karnofsky score: 90      Physical Exam   Constitutional: He is oriented to person, place, and time. He appears well-developed and well-nourished.   HENT:   Complex  incision on scalp vertex with sutures still in place, greater than 10 cm width, the central 3 cm portion he has yellow crusting but no mass effect.  The right and left lateral portions appear to be healing well.   Neck: Normal range of motion. Neck supple.   Cardiovascular: Normal rate, regular rhythm and normal heart sounds.    No murmur heard.  Pulmonary/Chest: Effort normal and breath sounds normal. He has no wheezes. He has no rales.   Abdominal: Soft. Bowel sounds are normal. He exhibits no distension. There is no hepatosplenomegaly. There is no tenderness.   Musculoskeletal: Normal range of motion. He exhibits no edema or tenderness.   Lymphadenopathy:     He has no cervical adenopathy.     He has no axillary adenopathy.        Right: No supraclavicular adenopathy present.        Left: No supraclavicular adenopathy present.   Neurological: He is alert and oriented to person, place, and time. He has normal strength. No sensory deficit.   Skin: Skin is warm and dry.   Psychiatric: He has a normal mood and affect. His behavior is normal. Judgment and thought content normal.   Nursing note and vitals reviewed.           The following portions of the patient's history were reviewed and updated as appropriate: allergies, current medications, past family history, past medical history, past social history, past surgical history and problem list.    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  Adjuvant radiotherapy is recommended for local tumor control due to the depth of invasion to the periosteum, previously noted rapid growth rate and in the setting of immune suppression while being treated for metastatic prostate cancer.    RECOMMENDATIONS:  45-50 Gray to be delivered over 3-4 weeks to the scalp tumor bed once he is healed sufficiently.    Return in about 2 weeks (around 5/16/2018) for Office Visit, Simulation.  Dusty was seen today for  other.    Diagnoses and all orders for this visit:    Malignant neoplasm of scalp         Prosper Salazar MD

## 2018-05-04 PROBLEM — C44.40: Status: ACTIVE | Noted: 2018-04-15

## 2018-05-09 ENCOUNTER — LAB (OUTPATIENT)
Dept: LAB | Facility: HOSPITAL | Age: 73
End: 2018-05-09

## 2018-05-09 ENCOUNTER — TRANSCRIBE ORDERS (OUTPATIENT)
Dept: LAB | Facility: HOSPITAL | Age: 73
End: 2018-05-09

## 2018-05-09 DIAGNOSIS — B95.61 STAPHYLOCOCCUS AUREUS SUPERFICIAL FOLLICULITIS: ICD-10-CM

## 2018-05-09 DIAGNOSIS — L73.9 STAPHYLOCOCCUS AUREUS SUPERFICIAL FOLLICULITIS: ICD-10-CM

## 2018-05-09 DIAGNOSIS — L02.01 ABSCESS OF TEMPLE REGION: ICD-10-CM

## 2018-05-09 DIAGNOSIS — L03.811 CELLULITIS OF SCALP: Primary | ICD-10-CM

## 2018-05-09 DIAGNOSIS — L03.811 CELLULITIS OF SCALP: ICD-10-CM

## 2018-05-09 PROCEDURE — 87070 CULTURE OTHR SPECIMN AEROBIC: CPT

## 2018-05-09 PROCEDURE — 87205 SMEAR GRAM STAIN: CPT

## 2018-05-12 LAB
BACTERIA SPEC AEROBE CULT: NORMAL
GRAM STN SPEC: NORMAL

## 2018-05-21 ENCOUNTER — HOSPITAL ENCOUNTER (OUTPATIENT)
Dept: RADIATION ONCOLOGY | Facility: HOSPITAL | Age: 73
Setting detail: RADIATION/ONCOLOGY SERIES
Discharge: HOME | End: 2018-05-21

## 2018-05-21 ENCOUNTER — OFFICE VISIT (OUTPATIENT)
Dept: RADIATION ONCOLOGY | Facility: HOSPITAL | Age: 73
End: 2018-05-21

## 2018-05-21 VITALS
WEIGHT: 172.5 LBS | SYSTOLIC BLOOD PRESSURE: 146 MMHG | RESPIRATION RATE: 20 BRPM | BODY MASS INDEX: 26.23 KG/M2 | HEART RATE: 69 BPM | TEMPERATURE: 97 F | OXYGEN SATURATION: 99 % | DIASTOLIC BLOOD PRESSURE: 66 MMHG

## 2018-05-21 DIAGNOSIS — C44.40 MALIGNANT NEOPLASM OF SCALP: Primary | ICD-10-CM

## 2018-05-21 PROCEDURE — G0463 HOSPITAL OUTPT CLINIC VISIT: HCPCS

## 2018-05-21 RX ORDER — SILVER SULFADIAZINE 1 %
CREAM (GRAM) TOPICAL
Refills: 0 | COMMUNITY
Start: 2018-05-09 | End: 2018-06-11

## 2018-05-21 NOTE — PROGRESS NOTES
RE-EVALUATION    PATIENT:                                                      Dusty Manzanares  :                                                          1945  DATE:                          2018   DIAGNOSIS:     Malignant neoplasm of the scalp       BRIEF HISTORY:  The patient is a very pleasant 73 y.o. male  with resected sarcoma scalp.  He's had slow but consistent healing of the surgical wound after secondary infection.  We're awaiting initiation of adjuvant therapy.  He is also undergoing systemic treatment for metastatic prostate cancer.  He's on Zytiga and prednisone which is likely slowing his healing process.    Allergies   Allergen Reactions   • Augmentin [Amoxicillin-Pot Clavulanate] Hives   • Sulfa Antibiotics Rash     Red like a sunburn all over       Review of Systems   Constitutional: Negative.    HENT:  Negative.    Eyes: Negative.    Respiratory: Negative.    Cardiovascular: Negative.    Gastrointestinal: Negative.    Endocrine: Negative.    Genitourinary: Negative.     Musculoskeletal: Negative.    Skin: Positive for wound (wound on top of head from surgery removal of cancer).   Neurological: Negative.    Hematological: Bruises/bleeds easily.   Psychiatric/Behavioral: Negative.            Objective   VITAL SIGNS:   Vitals:    18 1105   BP: 146/66   Pulse: 69   Resp: 20   Temp: 97 °F (36.1 °C)   TempSrc: Temporal Artery    SpO2: 99%   Weight: 78.2 kg (172 lb 8 oz)   PainSc: 0-No pain        KPS 90%    Physical Exam   Constitutional: He is oriented to person, place, and time. He appears well-developed and well-nourished.   HENT:   Scalp incision is well-healed laterally.  The central Z shaped defect is approximately 5-8 mm wide over a 2.5 cm area.  Yellow eschar present but no active purulence, drainage or erythema noted.  No apparent mass effect.   Neck: Normal range of motion. Neck supple.   Cardiovascular: Normal rate, regular rhythm and normal heart sounds.    No murmur  heard.  Pulmonary/Chest: Effort normal and breath sounds normal. He has no wheezes. He has no rales.   Abdominal: Soft. Bowel sounds are normal. He exhibits no distension. There is no hepatosplenomegaly. There is no tenderness.   Musculoskeletal: Normal range of motion. He exhibits no edema or tenderness.   Lymphadenopathy:     He has no cervical adenopathy.     He has no axillary adenopathy.        Right: No supraclavicular adenopathy present.        Left: No supraclavicular adenopathy present.   Neurological: He is alert and oriented to person, place, and time. He has normal strength. No sensory deficit.   Skin: Skin is warm and dry.   Psychiatric: He has a normal mood and affect. His behavior is normal. Judgment and thought content normal.   Nursing note and vitals reviewed.           The following portions of the patient's history were reviewed and updated as appropriate: allergies, current medications, past family history, past medical history, past social history, past surgical history and problem list.      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  One and a half months after resection.  Continued healing although at a slower rate.  No active infection or gross tumor recurrence noted.    RECOMMENDATIONS:  He'll return in 2-3 weeks to allow some additional time for healing.  Radiotherapy to the scalp resection bed will likely consist of electrons to deliver a dose of 45 Gray in 15 fractions over 3 weeks.         Prosper Salazar MD

## 2018-06-11 ENCOUNTER — HOSPITAL ENCOUNTER (OUTPATIENT)
Dept: RADIATION ONCOLOGY | Facility: HOSPITAL | Age: 73
Setting detail: RADIATION/ONCOLOGY SERIES
Discharge: HOME | End: 2018-06-11

## 2018-06-11 ENCOUNTER — OFFICE VISIT (OUTPATIENT)
Dept: RADIATION ONCOLOGY | Facility: HOSPITAL | Age: 73
End: 2018-06-11

## 2018-06-11 VITALS
SYSTOLIC BLOOD PRESSURE: 138 MMHG | TEMPERATURE: 96.4 F | HEART RATE: 82 BPM | DIASTOLIC BLOOD PRESSURE: 65 MMHG | RESPIRATION RATE: 20 BRPM

## 2018-06-11 DIAGNOSIS — C44.40 MALIGNANT NEOPLASM OF SCALP: Primary | ICD-10-CM

## 2018-06-11 PROCEDURE — G0463 HOSPITAL OUTPT CLINIC VISIT: HCPCS

## 2018-06-11 NOTE — PROGRESS NOTES
RE-EVALUATION    PATIENT:                                                      Dusty Manzanares  :                                                          1945  DATE:                          2018   DIAGNOSIS:     Malignant neoplasm of scalp       BRIEF HISTORY:  The patient is a very pleasant 73 y.o. 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  He continues to heal slowly.  He still has some drainage moist granulation tissue along the central portion of the scalp.  No erythema or purulence.  No fevers.  No mass effect.      Allergies   Allergen Reactions   • Augmentin [Amoxicillin-Pot Clavulanate] Hives   • Sulfa Antibiotics Rash     Red like a sunburn all over       Review of Systems   Constitutional: Negative.    HENT:  Negative.    Eyes: Negative.    Respiratory: Negative.    Cardiovascular: Negative.    Gastrointestinal: Negative.    Endocrine: Negative.    Genitourinary: Negative.     Musculoskeletal: Negative.    Skin: Positive for wound (top of scalp).   Neurological: Negative.    Hematological: Bruises/bleeds easily.   Psychiatric/Behavioral: Negative.            Objective   VITAL SIGNS:   Vitals:    18 0929   BP: 138/65   Pulse: 82   Resp: 20   Temp: 96.4 °F (35.8 °C)   TempSrc: Temporal Artery    PainSc: 0-No pain        KPS 90%    Physical Exam         The following portions of the patient's history were reviewed and updated as appropriate: allergies, current medications, past family history, past medical history, past social history, past surgical history and problem list.    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  He'll he has been slow but improving.  He is also undergoing systemic treatment for prostate cancer which is likely contributing to the slow  healing.    RECOMMENDATIONS:  He will return in 4 weeks and we will likely begin adjuvant radiotherapy at that time.  I've spoken with Dr. Hameed who will see him in the interim for evaluation.     Prosper Salazar MD

## 2018-06-12 ENCOUNTER — HOSPITAL ENCOUNTER (OUTPATIENT)
Dept: RADIATION ONCOLOGY | Facility: HOSPITAL | Age: 73
Setting detail: RADIATION/ONCOLOGY SERIES
Discharge: HOME | End: 2018-06-12

## 2018-07-12 ENCOUNTER — HOSPITAL ENCOUNTER (OUTPATIENT)
Dept: RADIATION ONCOLOGY | Facility: HOSPITAL | Age: 73
Discharge: HOME | End: 2018-07-12

## 2018-07-12 ENCOUNTER — OFFICE VISIT (OUTPATIENT)
Dept: RADIATION ONCOLOGY | Facility: HOSPITAL | Age: 73
End: 2018-07-12

## 2018-07-12 ENCOUNTER — HOSPITAL ENCOUNTER (OUTPATIENT)
Dept: RADIATION ONCOLOGY | Facility: HOSPITAL | Age: 73
Setting detail: RADIATION/ONCOLOGY SERIES
Discharge: HOME | End: 2018-07-12

## 2018-07-12 VITALS
DIASTOLIC BLOOD PRESSURE: 63 MMHG | SYSTOLIC BLOOD PRESSURE: 136 MMHG | WEIGHT: 174.2 LBS | OXYGEN SATURATION: 98 % | HEART RATE: 82 BPM | RESPIRATION RATE: 20 BRPM | TEMPERATURE: 96.1 F | BODY MASS INDEX: 26.49 KG/M2

## 2018-07-12 DIAGNOSIS — C44.40 MALIGNANT NEOPLASM OF SCALP: Primary | ICD-10-CM

## 2018-07-12 PROCEDURE — 77334 RADIATION TREATMENT AID(S): CPT | Performed by: RADIOLOGY

## 2018-07-12 PROCEDURE — G0463 HOSPITAL OUTPT CLINIC VISIT: HCPCS

## 2018-07-12 PROCEDURE — 77290 THER RAD SIMULAJ FIELD CPLX: CPT | Performed by: RADIOLOGY

## 2018-07-12 NOTE — PROGRESS NOTES
RE-EVALUATION    PATIENT:                                                      Dusty Manzanares  :                                                          1945  DATE:                          2018   DIAGNOSIS:     Malignant neoplasm of scalp       BRIEF HISTORY:  The patient is a very pleasant 73 y.o. 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    The surgical wound on the scalp has finally healed completely.      Allergies   Allergen Reactions   • Augmentin [Amoxicillin-Pot Clavulanate] Hives   • Sulfa Antibiotics Rash     Red like a sunburn all over       Review of Systems   Constitutional: Positive for fatigue.   HENT:   Positive for hearing loss.    Eyes: Negative.    Respiratory: Negative.    Cardiovascular: Negative.    Gastrointestinal: Negative.    Endocrine: Negative.    Genitourinary: Negative.     Musculoskeletal: Negative.    Skin: Positive for wound (healed incisioin on top of head).   Neurological: Negative.    Hematological: Bruises/bleeds easily.   Psychiatric/Behavioral: Negative.            Objective   VITAL SIGNS:   Vitals:    18 1302   BP: 136/63   Pulse: 82   Resp: 20   Temp: 96.1 °F (35.6 °C)   TempSrc: Temporal Artery    SpO2: 98%   Weight: 79 kg (174 lb 3.2 oz)   PainSc: 0-No pain        KPS 80%    Physical Exam   HENT:   Vertex scalp incision no longer has drainage or weeping.  No suspicious palpable mass.   Lymphadenopathy:     He has no cervical adenopathy.            The following portions of the patient's history were reviewed and updated as appropriate: allergies, current medications, past family history, past medical history, past social history, past surgical history and problem list.    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  The surgical site has finally healed sufficiently to begin adjuvant  radiotherapy.    RECOMMENDATIONS:    Simulation today.    The scalp resection site with margin will receive a dose of 45 Gray in 15 fractions, using electrons, over a 3 week course.         Prosper Salazar MD

## 2018-07-13 PROCEDURE — 77321 SPECIAL TELETX PORT PLAN: CPT | Performed by: RADIOLOGY

## 2018-07-13 PROCEDURE — 77300 RADIATION THERAPY DOSE PLAN: CPT | Performed by: RADIOLOGY

## 2018-07-19 ENCOUNTER — HOSPITAL ENCOUNTER (OUTPATIENT)
Dept: RADIATION ONCOLOGY | Facility: HOSPITAL | Age: 73
Discharge: HOME | End: 2018-07-19

## 2018-07-19 PROCEDURE — 77412 RADIATION TX DELIVERY LVL 3: CPT | Performed by: RADIOLOGY

## 2018-07-19 PROCEDURE — 77336 RADIATION PHYSICS CONSULT: CPT | Performed by: RADIOLOGY

## 2018-07-19 PROCEDURE — 77332 RADIATION TREATMENT AID(S): CPT | Performed by: RADIOLOGY

## 2018-07-20 ENCOUNTER — HOSPITAL ENCOUNTER (OUTPATIENT)
Dept: RADIATION ONCOLOGY | Facility: HOSPITAL | Age: 73
Discharge: HOME | End: 2018-07-20

## 2018-07-20 PROCEDURE — 77336 RADIATION PHYSICS CONSULT: CPT | Performed by: RADIOLOGY

## 2018-07-20 PROCEDURE — 77412 RADIATION TX DELIVERY LVL 3: CPT | Performed by: RADIOLOGY

## 2018-07-23 ENCOUNTER — HOSPITAL ENCOUNTER (OUTPATIENT)
Dept: RADIATION ONCOLOGY | Facility: HOSPITAL | Age: 73
Discharge: HOME | End: 2018-07-23

## 2018-07-23 PROCEDURE — 77412 RADIATION TX DELIVERY LVL 3: CPT | Performed by: RADIOLOGY

## 2018-07-24 ENCOUNTER — HOSPITAL ENCOUNTER (OUTPATIENT)
Dept: RADIATION ONCOLOGY | Facility: HOSPITAL | Age: 73
Discharge: HOME | End: 2018-07-24

## 2018-07-24 VITALS — BODY MASS INDEX: 26.85 KG/M2 | WEIGHT: 176.6 LBS

## 2018-07-24 PROCEDURE — 77412 RADIATION TX DELIVERY LVL 3: CPT | Performed by: RADIOLOGY

## 2018-07-25 ENCOUNTER — HOSPITAL ENCOUNTER (OUTPATIENT)
Dept: RADIATION ONCOLOGY | Facility: HOSPITAL | Age: 73
Discharge: HOME | End: 2018-07-25

## 2018-07-25 PROCEDURE — 77412 RADIATION TX DELIVERY LVL 3: CPT | Performed by: RADIOLOGY

## 2018-07-26 ENCOUNTER — HOSPITAL ENCOUNTER (OUTPATIENT)
Dept: RADIATION ONCOLOGY | Facility: HOSPITAL | Age: 73
Discharge: HOME | End: 2018-07-26

## 2018-07-26 PROCEDURE — 77336 RADIATION PHYSICS CONSULT: CPT | Performed by: RADIOLOGY

## 2018-07-26 PROCEDURE — 77412 RADIATION TX DELIVERY LVL 3: CPT | Performed by: RADIOLOGY

## 2018-07-27 ENCOUNTER — HOSPITAL ENCOUNTER (OUTPATIENT)
Dept: RADIATION ONCOLOGY | Facility: HOSPITAL | Age: 73
Discharge: HOME | End: 2018-07-27

## 2018-07-27 PROCEDURE — 77412 RADIATION TX DELIVERY LVL 3: CPT | Performed by: RADIOLOGY

## 2018-07-30 ENCOUNTER — HOSPITAL ENCOUNTER (OUTPATIENT)
Dept: RADIATION ONCOLOGY | Facility: HOSPITAL | Age: 73
Discharge: HOME | End: 2018-07-30

## 2018-07-30 PROCEDURE — 77412 RADIATION TX DELIVERY LVL 3: CPT | Performed by: RADIOLOGY

## 2018-07-31 ENCOUNTER — HOSPITAL ENCOUNTER (OUTPATIENT)
Dept: RADIATION ONCOLOGY | Facility: HOSPITAL | Age: 73
Discharge: HOME | End: 2018-07-31

## 2018-07-31 VITALS — WEIGHT: 176.6 LBS | BODY MASS INDEX: 26.85 KG/M2

## 2018-07-31 PROCEDURE — 77412 RADIATION TX DELIVERY LVL 3: CPT | Performed by: RADIOLOGY

## 2018-08-01 ENCOUNTER — HOSPITAL ENCOUNTER (OUTPATIENT)
Dept: RADIATION ONCOLOGY | Facility: HOSPITAL | Age: 73
Setting detail: RADIATION/ONCOLOGY SERIES
Discharge: HOME | End: 2018-08-01

## 2018-08-01 ENCOUNTER — HOSPITAL ENCOUNTER (OUTPATIENT)
Dept: RADIATION ONCOLOGY | Facility: HOSPITAL | Age: 73
Discharge: HOME | End: 2018-08-01

## 2018-08-01 PROCEDURE — 77412 RADIATION TX DELIVERY LVL 3: CPT | Performed by: RADIOLOGY

## 2018-08-02 ENCOUNTER — HOSPITAL ENCOUNTER (OUTPATIENT)
Dept: RADIATION ONCOLOGY | Facility: HOSPITAL | Age: 73
Discharge: HOME | End: 2018-08-02

## 2018-08-02 PROCEDURE — 77412 RADIATION TX DELIVERY LVL 3: CPT | Performed by: RADIOLOGY

## 2018-08-02 PROCEDURE — 77336 RADIATION PHYSICS CONSULT: CPT | Performed by: RADIOLOGY

## 2018-08-03 ENCOUNTER — HOSPITAL ENCOUNTER (OUTPATIENT)
Dept: RADIATION ONCOLOGY | Facility: HOSPITAL | Age: 73
Discharge: HOME | End: 2018-08-03

## 2018-08-03 PROCEDURE — 77412 RADIATION TX DELIVERY LVL 3: CPT | Performed by: RADIOLOGY

## 2018-08-06 ENCOUNTER — HOSPITAL ENCOUNTER (OUTPATIENT)
Dept: RADIATION ONCOLOGY | Facility: HOSPITAL | Age: 73
Discharge: HOME | End: 2018-08-06

## 2018-08-06 PROCEDURE — 77412 RADIATION TX DELIVERY LVL 3: CPT | Performed by: RADIOLOGY

## 2018-08-07 ENCOUNTER — HOSPITAL ENCOUNTER (OUTPATIENT)
Dept: RADIATION ONCOLOGY | Facility: HOSPITAL | Age: 73
Discharge: HOME | End: 2018-08-07

## 2018-08-07 VITALS — BODY MASS INDEX: 26.72 KG/M2 | WEIGHT: 175.7 LBS

## 2018-08-07 PROCEDURE — 77412 RADIATION TX DELIVERY LVL 3: CPT | Performed by: RADIOLOGY

## 2018-08-08 ENCOUNTER — HOSPITAL ENCOUNTER (OUTPATIENT)
Dept: RADIATION ONCOLOGY | Facility: HOSPITAL | Age: 73
Discharge: HOME | End: 2018-08-08

## 2018-08-08 PROCEDURE — 77412 RADIATION TX DELIVERY LVL 3: CPT | Performed by: RADIOLOGY

## 2018-08-17 ENCOUNTER — TELEPHONE (OUTPATIENT)
Dept: RADIATION ONCOLOGY | Facility: HOSPITAL | Age: 73
End: 2018-08-17

## 2018-08-17 RX ORDER — HYDROCODONE BITARTRATE AND ACETAMINOPHEN 5; 325 MG/1; MG/1
TABLET ORAL
Qty: 30 TABLET | Refills: 0 | Status: SHIPPED | OUTPATIENT
Start: 2018-08-17 | End: 2018-10-08

## 2018-08-17 NOTE — TELEPHONE ENCOUNTER
Pt's wife called stating he was having pain on the top of his head and that the skin was sloughing off.  She states in the am that there is some stringy yellow drainage from the top of his head. She denies he has had a fever.  She says he is using aquaphor and silvadene cream.  Discussed with Dr. Salazar and called wife back.  Explained that Dr. Salazar had sent in Rx for lidocaine and pain pill.  Instructed wife to stop using aquaphor and to mix 50/50 silvadene and lidocaine and apply 3-4 times per day and to store in refrigerator.  Tyler Leighton verbalized understanding.

## 2018-10-08 ENCOUNTER — HOSPITAL ENCOUNTER (OUTPATIENT)
Dept: RADIATION ONCOLOGY | Facility: HOSPITAL | Age: 73
Setting detail: RADIATION/ONCOLOGY SERIES
Discharge: HOME | End: 2018-10-08

## 2018-10-08 ENCOUNTER — OFFICE VISIT (OUTPATIENT)
Dept: RADIATION ONCOLOGY | Facility: HOSPITAL | Age: 73
End: 2018-10-08

## 2018-10-08 VITALS
RESPIRATION RATE: 20 BRPM | HEART RATE: 96 BPM | OXYGEN SATURATION: 96 % | SYSTOLIC BLOOD PRESSURE: 135 MMHG | BODY MASS INDEX: 27.1 KG/M2 | DIASTOLIC BLOOD PRESSURE: 62 MMHG | TEMPERATURE: 96.7 F | WEIGHT: 178.2 LBS

## 2018-10-08 DIAGNOSIS — C44.40 MALIGNANT NEOPLASM OF SCALP: Primary | ICD-10-CM

## 2018-10-08 PROCEDURE — G0463 HOSPITAL OUTPT CLINIC VISIT: HCPCS

## 2018-10-08 RX ORDER — FAMOTIDINE 20 MG
1000 TABLET ORAL
COMMUNITY

## 2018-10-08 NOTE — PROGRESS NOTES
FOLLOW UP NOTE    PATIENT:                                                      Dusty Manzanares  MEDICAL RECORD #:                        [id number]  :                                                          1945  COMPLETION DATE:   18  DIAGNOSIS:    Malignant neoplasm of scalp    BRIEF HISTORY:    Initial follow-up visit.  He has undifferentiated pleomorphic sarcoma of the scalp, status post resection followed by adjuvant radiotherapy.  After completion of radiotherapy he developed a moist skin reaction which healed after approximately 10 days.  He has since had no additional difficulties or complaints.  No headaches or nausea.    MEDICATIONS: Medication reconciliation for the patient was reviewed and confirmed in the electronic medical record.    Review of Systems   Constitutional: Positive for fatigue.   HENT:   Positive for hearing loss.    Eyes: Negative.    Respiratory: Negative.    Cardiovascular: Negative.    Gastrointestinal: Negative.    Endocrine: Negative.    Genitourinary: Negative.     Musculoskeletal: Negative.    Skin: Negative.    Neurological: Negative.    Hematological: Bruises/bleeds easily.   Psychiatric/Behavioral: Negative.        KPS 80%    Physical Exam   Constitutional: He is oriented to person, place, and time. He appears well-developed and well-nourished.   HENT:   Head: Normocephalic.   The skin on the scalp is well-healed, smooth and no suspicious mass present.  No intraoral lesions.  No neck masses.   Neck: Normal range of motion. Neck supple.   Cardiovascular: Normal rate, regular rhythm and normal heart sounds.    No murmur heard.  Pulmonary/Chest: Effort normal and breath sounds normal. He has no wheezes. He has no rales.   Abdominal: Soft. Bowel sounds are normal. He exhibits no distension. There is no hepatosplenomegaly. There is no tenderness.   Musculoskeletal: Normal range of motion. He exhibits no edema or tenderness.   Lymphadenopathy:     He has no cervical  adenopathy.     He has no axillary adenopathy.        Right: No supraclavicular adenopathy present.        Left: No supraclavicular adenopathy present.   Neurological: He is alert and oriented to person, place, and time. He has normal strength. No sensory deficit.   Skin: Skin is warm and dry.   Psychiatric: He has a normal mood and affect. His behavior is normal. Judgment and thought content normal.   Nursing note and vitals reviewed.      VITAL SIGNS:   Vitals:    10/08/18 1402   BP: 135/62   Pulse: 96   Resp: 20   Temp: 96.7 °F (35.9 °C)   TempSrc: Temporal Artery    SpO2: 96%   Weight: 80.8 kg (178 lb 3.2 oz)   PainSc: 0-No pain       The following portions of the patient's history were reviewed and updated as appropriate: allergies, current medications, past family history, past medical history, past social history, past surgical history and problem list.         Dusty was seen today for malignant neoplasm of scalp.    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  He tolerated treatment well with expected radiation dermatitis which has healed.  No evidence of disease recurrence.    RECOMMENDATIONS:  He plans to continue follow-up with dermatology for general skin management.  He will follow-up with Dr. Gavin for oncologic management of his metastatic prostate cancer.    Return if symptoms worsen or fail to improve.    Prosper Salazar MD    Errors in dictation may reflect use of voice recognition software and not all errors in transcription may have been detected prior to signing.

## 2020-01-29 ENCOUNTER — HOSPITAL ENCOUNTER (OUTPATIENT)
Dept: RADIATION ONCOLOGY | Facility: HOSPITAL | Age: 75
Setting detail: RADIATION/ONCOLOGY SERIES
Discharge: HOME | End: 2020-01-29

## 2020-01-29 ENCOUNTER — OFFICE VISIT (OUTPATIENT)
Dept: RADIATION ONCOLOGY | Facility: HOSPITAL | Age: 75
End: 2020-01-29

## 2020-01-29 VITALS
RESPIRATION RATE: 17 BRPM | HEART RATE: 74 BPM | WEIGHT: 167.7 LBS | SYSTOLIC BLOOD PRESSURE: 148 MMHG | OXYGEN SATURATION: 98 % | DIASTOLIC BLOOD PRESSURE: 62 MMHG | BODY MASS INDEX: 25.42 KG/M2 | TEMPERATURE: 97.5 F | HEIGHT: 68 IN

## 2020-01-29 DIAGNOSIS — C79.51 BONE METASTASES: Primary | ICD-10-CM

## 2020-01-29 DIAGNOSIS — C61 PROSTATE CANCER (HCC): Chronic | ICD-10-CM

## 2020-01-29 PROCEDURE — G0463 HOSPITAL OUTPT CLINIC VISIT: HCPCS

## 2020-01-29 RX ORDER — LOSARTAN POTASSIUM 50 MG/1
50 TABLET ORAL DAILY
COMMUNITY

## 2020-01-29 RX ORDER — MEGESTROL ACETATE 40 MG/1
40 TABLET ORAL DAILY
COMMUNITY
End: 2021-07-29

## 2020-01-29 NOTE — PROGRESS NOTES
CONSULTATION NOTE      :                                                          1945  DATE OF CONSULTATION:                       2020   REQUESTING PHYSICIAN:                   Robbin Gavin MD  REASON FOR CONSULTATION:           Bone metastasis, prostate cancer       BRIEF HISTORY:  The patient is a very pleasant 75 y.o. male  with metastatic high-grade prostate cancer diagnosed in .  He received conventionally fractionated pelvic irradiation and LHRH agonist initially.  He later developed castrate resistant disease and was treated successfully with Zytiga and prednisone for approximately 3-1/2 years.  When he became resistant to that therapy he has tried Xtandi without success or tolerance.  He has had a brief 2-month trial of Casodex without improvement; however, he reportedly did not reach castrate level with residual testosterone value approximately 75.  He is now off all systemic treatment and PSA had increased to a value of 64.9 ng/mL on 2019.  Nuclear medicine bone scan shows progression of bony metastatic disease.  CT abdomen pelvis also shows significant bone metastases.  Previous noted lymphadenopathy had improved.  Fortunately, the patient is not very symptomatic and only occasionally has mild aches and pains.  He does not chronically take narcotic pain medication.    Additionally, he has a history of multiple skin cancers and underwent a series of scalp irradiation 2018 here at [facility] with good local control of that site.    He was referred for consideration of radionuclide treatment, radium 223, for his bone metastases.    Allergies   Allergen Reactions   • Augmentin [Amoxicillin-Pot Clavulanate] Hives   • Sulfa Antibiotics Rash     Red like a sunburn all over       Social History     Socioeconomic History   • Marital status:      Spouse name: Not on file   • Number of children: Not on file   • Years of education: Not on file   • Highest education  level: Not on file   Tobacco Use   • Smoking status: Never Smoker   • Smokeless tobacco: Never Used   Substance and Sexual Activity   • Alcohol use: No   • Drug use: No   • Sexual activity: Defer   Social History Narrative    Mr. Manzanares is a 73 year old white male. [place] live in [place]. He has two sons. He is retired from the [employer]. He does not have an advanced directive.       Past Medical History:   Diagnosis Date   • Basal cell carcinoma    • CAD (coronary artery disease) 4/15/2018   • Cataracts, both eyes    • HTN (hypertension) 4/15/2018   • Hyperglycemia 4/15/2018   • Hypokalemia 4/15/2018   • Hyponatremia 4/15/2018   • Kidney stones    • Metastatic cancer (CMS/HCC)    • Myocardial infarction (CMS/HCC)     2010, CABG x5   • Prostate CA (CMS/HCC)     had spread outside of prostate when found, radiation to area only. on oral zytiga. still present. no surgical intervention.   • Prostate cancer (CMS/HCC) 4/15/2018   • Sarcoma (CMS/HCC) 4/15/2018   • Sepsis (CMS/HCC) 4/15/2018       family history includes Heart attack in his father; No Known Problems in his son; Stroke in his mother and sister.     Past Surgical History:   Procedure Laterality Date   • CARDIAC CATHETERIZATION      2010; s/p CABG after   • COLONOSCOPY  2012    Dr. Lyle- [facility]   • CORONARY ARTERY BYPASS GRAFT  2010    x5 Dr. Simms   • ENDOSCOPY      2006, dr burroughs, for dysphagia, pathology in chart   • KIDNEY STONE SURGERY     • MOHS SURGERY  2017 & 4/11/18    Dr. Zachary Bentley ( 2017- plastic surgery) Dr. Hameed   • PROSTATE BIOPSY      Dr. Lenoidas Bro   • SKIN CANCER EXCISION      2006, 2017,2018   • TRIGGER FINGER RELEASE      x2, one on each hand, pinky fingers        Review of Systems   Constitutional: Positive for appetite change, fatigue and unexpected weight change.   HENT:   Positive for hearing loss.    Eyes: Negative.    Respiratory: Positive for cough and shortness of breath (with activity).    Cardiovascular:  "Negative.    Gastrointestinal: Positive for constipation.   Endocrine: Negative.    Genitourinary: Negative.     Musculoskeletal: Positive for back pain, neck pain and neck stiffness.   Skin: Negative.    Neurological: Negative.    Hematological: Negative.    Psychiatric/Behavioral: Negative.            Objective   VITAL SIGNS:   Vitals:    01/29/20 1305   BP: 148/62   Pulse: 74   Resp: 17   Temp: 97.5 °F (36.4 °C)   TempSrc: Temporal   SpO2: 98%  Comment: RA   Weight: 76.1 kg (167 lb 11.2 oz)   Height: 172.7 cm (67.99\")   PainSc: 0-No pain        Karnofsky score: 80        Physical Exam   Constitutional: He is oriented to person, place, and time. He appears well-developed and well-nourished.   HENT:   Head: Normocephalic and atraumatic.   Male pattern alopecia within the radiation field with healthy skin and minimal postradiation changes.  No suspicious visible or palpable mass.   Neck: Normal range of motion. Neck supple.   Cardiovascular: Normal rate, regular rhythm and normal heart sounds.   No murmur heard.  Pulmonary/Chest: Effort normal and breath sounds normal. He has no wheezes. He has no rales.   Abdominal: Soft. Bowel sounds are normal. He exhibits no distension. There is no hepatosplenomegaly. There is no tenderness.   Musculoskeletal: Normal range of motion. He exhibits no edema or tenderness.   Lymphadenopathy:     He has no cervical adenopathy.     He has no axillary adenopathy.        Right: No supraclavicular adenopathy present.        Left: No supraclavicular adenopathy present.   Neurological: He is alert and oriented to person, place, and time. He has normal strength. No sensory deficit.   Skin: Skin is warm and dry.   Psychiatric: He has a normal mood and affect. His behavior is normal. Judgment and thought content normal.   Nursing note and vitals reviewed.           The following portions of the patient's history were reviewed and updated as appropriate: allergies, current medications, past " family history, past medical history, past social history, past surgical history and problem list.    Assessment      Metastatic prostate cancer, 11 years after initial diagnosis and treatment with pelvic irradiation and androgen suppression.  He has progressed through multiple treatment regimens with variable success.  He again has rising PSA.  He has bone metastases but does not have significant associated pain.  He does have unintentional weight loss.  He is a candidate for radionuclide therapy and we reviewed the Xofigo protocol for radium 223 infusions.  This is typically well-tolerated and may preserve quality of life and preventing progressive bone pain or fractures however we would have to verify that he has adequate blood counts before proceeding.  He would still be a candidate for subsequent systemic chemotherapy; however, patient would like to use cytotoxic treatment as a last resort.    RECOMMENDATIONS:  I would recommend retrial of intermittent androgen ablation since he does not appear to have been given a recent trial of total androgen suppression to castrate levels.  He will return to Dr. Davila for reinitiation of LHRH agonist.  If he does not achieve a PSA response or if he develops symptomatic pain, he will return for radionuclide therapy.  If he does not subsequently respond to radionuclide therapy, he should still be a candidate for systemic chemotherapy.  All questions were answered.  Patient understands that treatment intent is palliative in nature, although he has had a very long history of metastatic prostate cancer and has already exceeded average survival with very good quality of life in the interim.    Return if symptoms worsen or fail to improve.  Dusty was seen today for prostate cancer.    Diagnoses and all orders for this visit:    Bone metastases (CMS/HCC)    Prostate cancer (CMS/HCC)         Prosper Salazar MD

## 2021-07-29 ENCOUNTER — TELEPHONE (OUTPATIENT)
Dept: RADIATION ONCOLOGY | Facility: HOSPITAL | Age: 76
End: 2021-07-29

## 2021-07-29 ENCOUNTER — HOSPITAL ENCOUNTER (OUTPATIENT)
Dept: RADIATION ONCOLOGY | Facility: HOSPITAL | Age: 76
Setting detail: RADIATION/ONCOLOGY SERIES
Discharge: HOME | End: 2021-07-29

## 2021-07-29 ENCOUNTER — OFFICE VISIT (OUTPATIENT)
Dept: RADIATION ONCOLOGY | Facility: HOSPITAL | Age: 76
End: 2021-07-29

## 2021-07-29 VITALS
TEMPERATURE: 97.8 F | BODY MASS INDEX: 24.86 KG/M2 | SYSTOLIC BLOOD PRESSURE: 172 MMHG | HEART RATE: 87 BPM | WEIGHT: 164 LBS | DIASTOLIC BLOOD PRESSURE: 77 MMHG | OXYGEN SATURATION: 98 % | HEIGHT: 68 IN | RESPIRATION RATE: 16 BRPM

## 2021-07-29 DIAGNOSIS — C61 PROSTATE CANCER (HCC): Chronic | ICD-10-CM

## 2021-07-29 DIAGNOSIS — C79.51 PAIN FROM BONE METASTASES (HCC): Primary | ICD-10-CM

## 2021-07-29 DIAGNOSIS — G89.3 PAIN FROM BONE METASTASES (HCC): Primary | ICD-10-CM

## 2021-07-29 PROCEDURE — G0463 HOSPITAL OUTPT CLINIC VISIT: HCPCS

## 2021-07-29 RX ORDER — AMLODIPINE BESYLATE 5 MG/1
TABLET ORAL
COMMUNITY
Start: 2021-06-15

## 2021-07-29 RX ORDER — PREDNISONE 10 MG/1
10 TABLET ORAL DAILY
COMMUNITY

## 2021-07-29 RX ORDER — METOCLOPRAMIDE 10 MG/1
TABLET ORAL
COMMUNITY
Start: 2021-07-06

## 2021-07-29 NOTE — PROGRESS NOTES
CONSULTATION NOTE      :                                                          1945  DATE OF CONSULTATION:                       2021   REQUESTING PHYSICIAN:                   Robbin Gavin MD  REASON FOR CONSULTATION:           Bone metastasis, prostate cancer       BRIEF HISTORY:  The patient is a very pleasant 76 y.o. male  with castrate resistant metastatic prostate cancer.  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  He has metastatic high-grade prostate cancer initially diagnosed in  treated with conventional fractionated pelvic irradiation and androgen ablation at that time.  He has had subsequent systemic treatments with Zytiga and prednisone for approximately 3-1/2 years.  When he became resistant to that therapy he has tried Xtandi without success or tolerance.  He has had a brief 2-month trial of Casodex without improvement; however, he reportedly did not reach castrate level with residual testosterone value approximately 75.  I saw him in 2020 when he was off all systemic treatment and PSA had increased to a value of 64.9 ng/mL on 2019.  Nuclear medicine bone scan shoed progression of bony metastatic disease.  CT abdomen pelvis also shows significant bone metastases.  Previous noted lymphadenopathy had improved.  He subsequently restarted total androgen ablation with Eligard there were  PSA decreasing from 116 ng/ml down to 99 ng/mL 2020, gato at 67.1 NG/mL on 6/3/2021 increasing to a value 239.86 NG/mL on 2021.  Patient declined any additional systemic treatment with chemotherapy.  He has pain in the right shoulder and lower back which correlates with abnormalities on imaging studies.  He was referred for consideration of radionuclide therapy.  Laboratory work from 2021 shows adequate WBC 12.9, hemoglobin 12.1, hematocrit 35.8, platelets 438,000.    Allergy:   Allergies    Allergen Reactions   • Augmentin [Amoxicillin-Pot Clavulanate] Hives   • Morphine Nausea And Vomiting   • Sulfa Antibiotics Rash     Red like a sunburn all over       Social History:   Social History     Socioeconomic History   • Marital status:      Spouse name: Not on file   • Number of children: Not on file   • Years of education: Not on file   • Highest education level: Not on file   Tobacco Use   • Smoking status: Never Smoker   • Smokeless tobacco: Never Used   Substance and Sexual Activity   • Alcohol use: No   • Drug use: No   • Sexual activity: Defer       Past Medical History:   Past Medical History:   Diagnosis Date   • Basal cell carcinoma    • CAD (coronary artery disease) 4/15/2018   • Cataracts, both eyes    • HTN (hypertension) 4/15/2018   • Hyperglycemia 4/15/2018   • Hypokalemia 4/15/2018   • Hyponatremia 4/15/2018   • Kidney stones    • Metastatic cancer (CMS/HCC)    • Myocardial infarction (CMS/HCC)     2010, CABG x5   • Prostate CA (CMS/HCC)     had spread outside of prostate when found, radiation to area only. on oral zytiga. still present. no surgical intervention.   • Prostate cancer (CMS/HCC) 4/15/2018   • Sarcoma (CMS/HCC) 4/15/2018   • Sepsis (CMS/HCC) 4/15/2018       Family History: family history includes Heart attack in his father; No Known Problems in his son; Stroke in his mother and sister.     Surgical History:   Past Surgical History:   Procedure Laterality Date   • CARDIAC CATHETERIZATION      2010; s/p CABG after   • COLONOSCOPY  2012    Dr. Lyle- [facility]   • CORONARY ARTERY BYPASS GRAFT  2010    x5 Dr. Simms   • ENDOSCOPY      2006, dr burroughs, for dysphagia, pathology in chart   • KIDNEY STONE SURGERY     • MOHS SURGERY  2017 & 4/11/18    Dr. Zachary Bentley ( 2017- plastic surgery) Dr. Hameed   • PROSTATE BIOPSY      Dr. Leonidas Bro   • SKIN CANCER EXCISION      2006, 2017,2018   • TRIGGER FINGER RELEASE      x2, one on each hand, pinky fingers        Review  "of Systems:   Review of Systems   Constitutional: Positive for fatigue.   HENT:   Positive for hearing loss.    Respiratory: Positive for cough.    Gastrointestinal: Positive for constipation.   Musculoskeletal: Positive for arthralgias, back pain and neck pain.           Objective   VITAL SIGNS:   Vitals:    07/29/21 0759   BP: 172/77   Pulse: 87   Resp: 16   Temp: 97.8 °F (36.6 °C)   SpO2: 98%  Comment: RA   Weight: 74.4 kg (164 lb)   Height: 172.7 cm (68\")   PainSc: 0-No pain        Karnofsky score: 70      Physical Exam:   Physical Exam  Vitals and nursing note reviewed.   Constitutional:       Appearance: He is well-developed.   HENT:      Head:      Comments: Healed scalp with male pattern alopecia but healthy skin and no evidence of recurrent skin cancer.  Cardiovascular:      Rate and Rhythm: Normal rate and regular rhythm.      Heart sounds: Normal heart sounds. No murmur heard.     Pulmonary:      Effort: Pulmonary effort is normal.      Breath sounds: Normal breath sounds. No wheezing or rales.   Abdominal:      General: Bowel sounds are normal. There is no distension.      Palpations: Abdomen is soft.      Tenderness: There is no abdominal tenderness.   Musculoskeletal:         General: Tenderness ( Right shoulder and lumbar/sacral regions) present. Normal range of motion.      Cervical back: Normal range of motion and neck supple.   Lymphadenopathy:      Cervical: No cervical adenopathy.      Upper Body:      Right upper body: No supraclavicular adenopathy.      Left upper body: No supraclavicular adenopathy.   Skin:     General: Skin is warm and dry.   Neurological:      Mental Status: He is alert and oriented to person, place, and time.      Sensory: No sensory deficit.   Psychiatric:         Behavior: Behavior normal.         Thought Content: Thought content normal.         Judgment: Judgment normal.              The following portions of the patient's history were reviewed and updated as " appropriate: allergies, current medications, past family history, past medical history, past social history, past surgical history and problem list.    Assessment:   Assessment      Symptomatic, castrate resistant, metastatic prostate cancer with primarily bone metastases.  He is a candidate for radionuclide therapy with radium 223.  The Xofigo protocol been reviewed in detail with patient and wife.  Informed consent was obtained.  Laboratory work is adequate for initiation of radionuclide injections.    Previously treated skin cancer of scalp, in extended remission status.    RECOMMENDATIONS: He will receive a series of up to 6 radionuclide injections of radium 223, every 4 weeks.  We will try to deliver the first infusion next Tuesday.  Surveillance blood work including CBC, chemistry and PSA will be drawn approximately 3 weeks after each infusion at [facility] to determine eligibility for the subsequent fusion.    Follow Up:   Return in about 5 days (around 8/3/2021) for Procedure.  Diagnoses and all orders for this visit:    1. Pain from bone metastases (CMS/HCC) (Primary)    2. Prostate cancer (CMS/HCC)         Prosper Salazar MD

## 2021-08-02 DIAGNOSIS — C61 PROSTATE CANCER (HCC): Primary | ICD-10-CM

## 2021-08-03 ENCOUNTER — PROCEDURE VISIT (OUTPATIENT)
Dept: RADIATION ONCOLOGY | Facility: HOSPITAL | Age: 76
End: 2021-08-03

## 2021-08-03 ENCOUNTER — HOSPITAL ENCOUNTER (OUTPATIENT)
Dept: RADIATION ONCOLOGY | Facility: HOSPITAL | Age: 76
Setting detail: RADIATION/ONCOLOGY SERIES
Discharge: HOME | End: 2021-08-03

## 2021-08-03 VITALS
SYSTOLIC BLOOD PRESSURE: 188 MMHG | RESPIRATION RATE: 20 BRPM | BODY MASS INDEX: 25.24 KG/M2 | HEART RATE: 91 BPM | WEIGHT: 166 LBS | OXYGEN SATURATION: 97 % | TEMPERATURE: 97.8 F | DIASTOLIC BLOOD PRESSURE: 78 MMHG

## 2021-08-03 DIAGNOSIS — G89.3 PAIN FROM BONE METASTASES (HCC): Primary | ICD-10-CM

## 2021-08-03 DIAGNOSIS — C61 PROSTATE CANCER (HCC): Chronic | ICD-10-CM

## 2021-08-03 DIAGNOSIS — C79.51 PAIN FROM BONE METASTASES (HCC): Primary | ICD-10-CM

## 2021-08-03 PROCEDURE — G0463 HOSPITAL OUTPT CLINIC VISIT: HCPCS

## 2021-08-03 PROCEDURE — 79101 NUCLEAR RX IV ADMIN: CPT | Performed by: RADIOLOGY

## 2021-08-03 PROCEDURE — A9606 RADIUM RA223 DICHLORIDE THER: HCPCS | Performed by: RADIOLOGY

## 2021-08-03 PROCEDURE — 77300 RADIATION THERAPY DOSE PLAN: CPT | Performed by: RADIOLOGY

## 2021-08-03 NOTE — PROGRESS NOTES
SPECIAL TREATMENT PROCEDURE NOTE  INFUSION OF RADIONUCLIDE RADIUM-223    PATIENT:                                                      Dusty Manzanares  MEDICAL RECORD #:                        [id number]  :                                                          1945  DATE OF PROCEDURE:                       8/3/2021   DIAGNOSIS:     Bone Metastases  STAGE:     IV (M1)    PROCEDURE:  Injection of Radium-223 dichloride (Xofigo) 1 of 6 planned.    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  Radionuclide therapy is indicated for palliation.  Current pain 6/10 in neck.    The inherent complexity of this case, based on multi-modality coordination of treatment and the interdisciplinary coordination of care for this patient justifies a special treatment procedure.    Informed consent was obtained and is documented in the chart.    LABORATORY:  Pre-injection CBC from 2021:  Lab Results   Component Value Date    WBC 12.9 (H)     HGB 12.1 (L)     HCT 35.8 (L)     MCV      ,000      showed adequate values for radionuclide therapy.     2021 PSA value: 239.86 ng/ml.    Alkaline Phosphatase: 139 U/L    PROCEDURE DESCRIPTION:  Radium-223 (Xofigo) was prescribed at 1.49 µCi/Kg.  We received a dose of 113.9 µCi in 6 mL.  Special dosimetry assay performed at [facility] confirmed 113 µCi dosing.  After adequate hydration, the entire volume was manually infused through slow intravenous push over 2 minutes with subsequent IV hydration to flush the line.  Postinfusion assay showed residual 1 µCi resulting in total infusion activity 112 µCi.    COMPLICATIONS:  None.  The patient tolerated the procedure well with no apparent side effects.    DISPOSITION:   Our physicist surveyed the room and no residual radioactivity was present.  All supplies were disposed of appropriately.  Patient was  discharged to home with radiation precautions and instructions.  Follow-up will be with repeat CBC in 3 weeks and next injection of radionuclide in approximately 4 weeks.

## 2021-08-31 ENCOUNTER — PROCEDURE VISIT (OUTPATIENT)
Dept: RADIATION ONCOLOGY | Facility: HOSPITAL | Age: 76
End: 2021-08-31

## 2021-08-31 VITALS
WEIGHT: 165.6 LBS | BODY MASS INDEX: 25.18 KG/M2 | RESPIRATION RATE: 20 BRPM | HEART RATE: 93 BPM | OXYGEN SATURATION: 98 % | TEMPERATURE: 96.8 F | DIASTOLIC BLOOD PRESSURE: 79 MMHG | SYSTOLIC BLOOD PRESSURE: 176 MMHG

## 2021-08-31 DIAGNOSIS — G89.3 PAIN FROM BONE METASTASES (HCC): Primary | ICD-10-CM

## 2021-08-31 DIAGNOSIS — C79.51 PAIN FROM BONE METASTASES (HCC): Primary | ICD-10-CM

## 2021-08-31 PROCEDURE — 79101 NUCLEAR RX IV ADMIN: CPT | Performed by: RADIOLOGY

## 2021-08-31 PROCEDURE — A9606 RADIUM RA223 DICHLORIDE THER: HCPCS | Performed by: RADIOLOGY

## 2021-08-31 PROCEDURE — G0463 HOSPITAL OUTPT CLINIC VISIT: HCPCS

## 2021-08-31 PROCEDURE — 77300 RADIATION THERAPY DOSE PLAN: CPT | Performed by: RADIOLOGY

## 2021-08-31 RX ORDER — TRAMADOL HYDROCHLORIDE 50 MG/1
TABLET ORAL
COMMUNITY
Start: 2021-08-11

## 2021-08-31 RX ORDER — ONDANSETRON HYDROCHLORIDE 8 MG/1
TABLET, FILM COATED ORAL
COMMUNITY
Start: 2021-08-11

## 2021-08-31 NOTE — PROGRESS NOTES
SPECIAL TREATMENT PROCEDURE NOTE  INFUSION OF RADIONUCLIDE RADIUM-223    PATIENT:                                                      Dusty Manzanares  MEDICAL RECORD #:                        [id number]  :                                                          1945  DATE OF PROCEDURE:                       2021   DIAGNOSIS:     Bone Metastases  STAGE:     IV (M1)    PROCEDURE:  Injection of Radium-223 dichloride (Xofigo) 2 of 6 planned.    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  Radionuclide therapy is indicated for palliation.  Current pain 10 neck.  He had a flare of pain after first infusion, now improved.  The inherent complexity of this case, based on multi-modality coordination of treatment and the interdisciplinary coordination of care for this patient justifies a special treatment procedure.  Informed consent was obtained and is documented in the chart.    LABORATORY:  Pre-injection CBC:  Lab Results   Component Value Date    WBC 12.58 (H) 04/15/2018    HGB 11.1 (L) 04/15/2018    HCT 34.0 (L) 04/15/2018    MCV 95.0 04/15/2018     04/15/2018     Lab Results   Component Value Date    ALKPHOS 83 04/15/2018      ng/ml.    PROCEDURE DESCRIPTION:  Radium-223 (Xofigo) was prescribed at 1.49 µCi/Kg.  We received a dose of 112 µCi in 6.1 mL.  Special dosimetry assay performed at [facility] confirmed 115.5 µCi dosing.  After adequate hydration, the entire volume was manually infused through slow intravenous push over 2 minutes with subsequent IV hydration to flush the line.  Postinfusion assay showed residual 2 µCi resulting in total infusion activity 113.5 µCi.    COMPLICATIONS:  None.  The patient tolerated the procedure well with no apparent side effects.    DISPOSITION:   Our physicist surveyed the room and no residual radioactivity was present.  All supplies  were disposed of appropriately.  Patient was discharged to home with radiation precautions and instructions.  Follow-up will be with repeat CBC in 3 weeks and next injection of radionuclide in approximately 4 weeks.

## 2021-09-28 ENCOUNTER — PROCEDURE VISIT (OUTPATIENT)
Dept: RADIATION ONCOLOGY | Facility: HOSPITAL | Age: 76
End: 2021-09-28

## 2021-09-28 ENCOUNTER — HOSPITAL ENCOUNTER (OUTPATIENT)
Dept: RADIATION ONCOLOGY | Facility: HOSPITAL | Age: 76
Setting detail: RADIATION/ONCOLOGY SERIES
Discharge: HOME | End: 2021-09-28

## 2021-09-28 VITALS
BODY MASS INDEX: 24.81 KG/M2 | RESPIRATION RATE: 20 BRPM | WEIGHT: 163.2 LBS | SYSTOLIC BLOOD PRESSURE: 158 MMHG | OXYGEN SATURATION: 98 % | HEART RATE: 92 BPM | DIASTOLIC BLOOD PRESSURE: 76 MMHG | TEMPERATURE: 97.7 F

## 2021-09-28 DIAGNOSIS — C61 PROSTATE CANCER (HCC): Primary | Chronic | ICD-10-CM

## 2021-09-28 PROCEDURE — A9606 RADIUM RA223 DICHLORIDE THER: HCPCS | Performed by: RADIOLOGY

## 2021-09-28 PROCEDURE — 77300 RADIATION THERAPY DOSE PLAN: CPT | Performed by: RADIOLOGY

## 2021-09-28 PROCEDURE — 79101 NUCLEAR RX IV ADMIN: CPT | Performed by: RADIOLOGY

## 2021-09-28 NOTE — PROGRESS NOTES
SPECIAL TREATMENT PROCEDURE NOTE  INFUSION OF RADIONUCLIDE RADIUM-223    PATIENT:                                                      Dusty Manzanares  MEDICAL RECORD #:                        [id number]  :                                                          1945  DATE OF PROCEDURE:                       2021   DIAGNOSIS:     Bone Metastases  STAGE:     IV (M1)    PROCEDURE:  Injection of Radium-223 dichloride (Xofigo) 3 of 6 planned.    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  Radionuclide therapy is indicated for palliation.  Current pain 3/10-4/10 in back.    He recently received IV fluids for dehydration.   The inherent complexity of this case, based on multi-modality coordination of treatment and the interdisciplinary coordination of care for this patient justifies a special treatment procedure.  Informed consent was obtained and is documented in the chart.    LABORATORY:  Pre-injection CBC from 21 showed adequate values for radionuclide therapy:  WBC 8.2, HGB 13.8, HCT 41.4, PLT 202K    .23 ng/ml    Alkaline Phospatase 82    PROCEDURE DESCRIPTION:  Radium-223 (Xofigo) was prescribed at 1.49 µCi/Kg.  We received a dose of 117 µCi in 6 mL.  Special dosimetry assay performed at [facility] confirmed 114 µCi dosing.  After adequate hydration, the entire volume was manually infused through slow intravenous push over 2 minutes with subsequent IV hydration to flush the line.  Postinfusion assay showed residual 2 µCi resulting in total infusion activity 112 µCi.    COMPLICATIONS:  None.  The patient tolerated the procedure well with no apparent side effects.    DISPOSITION:   Our physicist surveyed the room and no residual radioactivity was present.  All supplies were disposed of appropriately.  Patient was discharged to home with radiation precautions and  instructions.  Follow-up will be with repeat CBC in 3 weeks and next injection of radionuclide in approximately 4 weeks.

## 2021-10-19 ENCOUNTER — TELEPHONE (OUTPATIENT)
Dept: RADIATION ONCOLOGY | Facility: HOSPITAL | Age: 76
End: 2021-10-19

## 2021-10-19 NOTE — TELEPHONE ENCOUNTER
Called to get labs faxed for xofigo injection next week. Infusion states that pt collapsed and went pulseless this morning and was transferred to the ER.  Dr. Salazar notified and xofigo injection cancelled.

## 2021-10-22 ENCOUNTER — TELEPHONE (OUTPATIENT)
Dept: RADIATION ONCOLOGY | Facility: HOSPITAL | Age: 76
End: 2021-10-22

## 2021-10-22 NOTE — TELEPHONE ENCOUNTER
Pt's wife called to report he is in the hospital.  She states he went to the lab to get bloodwork done on Tues and while he was there, his heart stopped.  She states he is in ICU on the ventilator and they are going to try to extubate him today. She asked what to do about his appointment for xofigo on Tues.  I explained we will put that on hold for now.  Instructed her to call me when he is out of the hospital and is stable.  Niles verbalized understanding.

## 2021-10-26 ENCOUNTER — HOSPITAL ENCOUNTER (OUTPATIENT)
Dept: RADIATION ONCOLOGY | Facility: HOSPITAL | Age: 76
Setting detail: RADIATION/ONCOLOGY SERIES
Discharge: HOME | End: 2021-10-26

## 2021-11-18 ENCOUNTER — TELEPHONE (OUTPATIENT)
Dept: RADIATION ONCOLOGY | Facility: HOSPITAL | Age: 76
End: 2021-11-18

## 2021-11-18 NOTE — TELEPHONE ENCOUNTER
Pt called to report that he is out of the hospital and ready to resume his xofigo injections.  He states he saw Dr. Gavin yesterday and he advised pt to wait until the end of December.  Discussed with Dr. Salazar and called pt back.  He will have labs drawn on Dec 14 and xofigo will be given @ 1500 on Dec 21.  Pt verbalized understanding.

## 2021-12-21 ENCOUNTER — PROCEDURE VISIT (OUTPATIENT)
Dept: RADIATION ONCOLOGY | Facility: HOSPITAL | Age: 76
End: 2021-12-21

## 2021-12-21 ENCOUNTER — HOSPITAL ENCOUNTER (OUTPATIENT)
Dept: RADIATION ONCOLOGY | Facility: HOSPITAL | Age: 76
Setting detail: RADIATION/ONCOLOGY SERIES
Discharge: HOME | End: 2021-12-21

## 2021-12-21 VITALS
SYSTOLIC BLOOD PRESSURE: 172 MMHG | OXYGEN SATURATION: 98 % | TEMPERATURE: 97.1 F | WEIGHT: 157.4 LBS | DIASTOLIC BLOOD PRESSURE: 73 MMHG | HEART RATE: 64 BPM | RESPIRATION RATE: 20 BRPM | BODY MASS INDEX: 23.93 KG/M2

## 2021-12-21 DIAGNOSIS — C61 PROSTATE CANCER: Primary | ICD-10-CM

## 2021-12-21 PROCEDURE — 77300 RADIATION THERAPY DOSE PLAN: CPT | Performed by: RADIOLOGY

## 2021-12-21 PROCEDURE — A9606 RADIUM RA223 DICHLORIDE THER: HCPCS | Performed by: RADIOLOGY

## 2021-12-21 PROCEDURE — 79101 NUCLEAR RX IV ADMIN: CPT | Performed by: RADIOLOGY

## 2022-01-18 ENCOUNTER — HOSPITAL ENCOUNTER (OUTPATIENT)
Dept: RADIATION ONCOLOGY | Facility: HOSPITAL | Age: 77
Setting detail: RADIATION/ONCOLOGY SERIES
Discharge: HOME | End: 2022-01-18

## 2022-01-18 ENCOUNTER — PROCEDURE VISIT (OUTPATIENT)
Dept: RADIATION ONCOLOGY | Facility: HOSPITAL | Age: 77
End: 2022-01-18

## 2022-01-18 VITALS
BODY MASS INDEX: 24.51 KG/M2 | HEART RATE: 67 BPM | DIASTOLIC BLOOD PRESSURE: 72 MMHG | RESPIRATION RATE: 20 BRPM | WEIGHT: 161.2 LBS | SYSTOLIC BLOOD PRESSURE: 165 MMHG | TEMPERATURE: 97.5 F | OXYGEN SATURATION: 95 %

## 2022-01-18 DIAGNOSIS — C61 PROSTATE CANCER: Chronic | ICD-10-CM

## 2022-01-18 DIAGNOSIS — G89.3 PAIN FROM BONE METASTASES: Primary | ICD-10-CM

## 2022-01-18 DIAGNOSIS — C79.51 PAIN FROM BONE METASTASES: Primary | ICD-10-CM

## 2022-01-18 PROCEDURE — 79101 NUCLEAR RX IV ADMIN: CPT | Performed by: RADIOLOGY

## 2022-01-18 PROCEDURE — A9606 RADIUM RA223 DICHLORIDE THER: HCPCS | Performed by: RADIOLOGY

## 2022-01-18 PROCEDURE — G0463 HOSPITAL OUTPT CLINIC VISIT: HCPCS

## 2022-01-18 PROCEDURE — 77300 RADIATION THERAPY DOSE PLAN: CPT | Performed by: RADIOLOGY

## 2022-01-18 NOTE — PROGRESS NOTES
SPECIAL TREATMENT PROCEDURE NOTE  INFUSION OF RADIONUCLIDE RADIUM-223    PATIENT:                                                      Dusty Manzanares  MEDICAL RECORD #:                        [id number]  :                                                          1945  DATE OF PROCEDURE:                       2022   DIAGNOSIS:     Bone Metastases  STAGE:     IV (M1)    PROCEDURE:  Injection of Radium-223 dichloride (Xofigo) 5 of 6 planned.    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  Radionuclide therapy is indicated for palliation.  Current pain 3/10 back.  The prior hip pain resolved.  The inherent complexity of this case, based on multi-modality coordination of treatment and the interdisciplinary coordination of care for this patient justifies a special treatment procedure.  Informed consent was obtained and is documented in the chart.    LABORATORY:  Pre-injection CBC from 2022 showed adequate values for radionuclide therapy. .39, WBC 7.8, Hgb 12.1, Hct 38.9, Plt 224,000, Alk Phos 70.    PROCEDURE DESCRIPTION:  Radium-223 (Xofigo) was prescribed at 1.49 µCi/Kg.  We received a dose of 106 µCi in 5.7 mL.  Special dosimetry assay performed at [facility] confirmed 105 µCi dosing.  After adequate hydration, the entire volume was manually infused through slow intravenous push over 2 minutes with subsequent IV hydration to flush the line.  Postinfusion assay showed residual 2 µCi resulting in total infusion activity 103 µCi.    COMPLICATIONS:  None.  The patient tolerated the procedure well with no apparent side effects.    DISPOSITION:   Our physicist surveyed the room and no residual radioactivity was present.  All supplies were disposed of appropriately.  Patient was discharged to home with radiation precautions and instructions.  Follow-up will be with repeat CBC in 3  weeks and next injection of radionuclide in approximately 4 weeks.

## 2022-01-18 NOTE — PROGRESS NOTES
SPECIAL TREATMENT PROCEDURE NOTE  INFUSION OF RADIONUCLIDE RADIUM-223    PATIENT:                                                      Dusty Manzanares  MEDICAL RECORD #:                        [id number]  :                                                          1945  DATE OF PROCEDURE:                       2022   DIAGNOSIS:     Bone Metastases  STAGE:     IV (M1)    PROCEDURE:  Injection of Radium-223 dichloride (Xofigo) 4 of 6 planned.    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  Radionuclide therapy is indicated for palliation.  Current pain 3/10.  The inherent complexity of this case, based on multi-modality coordination of treatment and the interdisciplinary coordination of care for this patient justifies a special treatment procedure.  Informed consent was obtained and is documented in the chart.    LABORATORY:  Pre-injection CBC from 2021 showed adequate values for radionuclide therapy. WBC 9, Hgb 11.8, Hct 27.1, Plts 215,000.    .11 ng/ml.   Alk Phos 86 U/L.     PROCEDURE DESCRIPTION:  Radium-223 (Xofigo) was prescribed at 1.49 µCi/Kg.  We received a dose of 109.25 µCi in 5.72 mL.  Special dosimetry assay performed at [facility] confirmed 106 µCi dosing.  After adequate hydration, the entire volume was manually infused through slow intravenous push over 2 minutes with subsequent IV hydration to flush the line.  Postinfusion assay showed residual 1 µCi resulting in total infusion activity 105 µCi.    COMPLICATIONS:  None.  The patient tolerated the procedure well with no apparent side effects.    DISPOSITION:   Our physicist surveyed the room and no residual radioactivity was present.  All supplies were disposed of appropriately.  Patient was discharged to home with radiation precautions and instructions.  Follow-up will be with repeat CBC in 3 weeks and next  injection of radionuclide in approximately 4 weeks.

## 2022-02-15 ENCOUNTER — HOSPITAL ENCOUNTER (OUTPATIENT)
Dept: RADIATION ONCOLOGY | Facility: HOSPITAL | Age: 77
Setting detail: RADIATION/ONCOLOGY SERIES
Discharge: HOME | End: 2022-02-15

## 2022-02-15 ENCOUNTER — PROCEDURE VISIT (OUTPATIENT)
Dept: RADIATION ONCOLOGY | Facility: HOSPITAL | Age: 77
End: 2022-02-15

## 2022-02-15 VITALS
TEMPERATURE: 96.7 F | OXYGEN SATURATION: 96 % | DIASTOLIC BLOOD PRESSURE: 86 MMHG | HEART RATE: 69 BPM | SYSTOLIC BLOOD PRESSURE: 196 MMHG | RESPIRATION RATE: 20 BRPM | WEIGHT: 159 LBS | BODY MASS INDEX: 24.18 KG/M2

## 2022-02-15 DIAGNOSIS — C61 PROSTATE CANCER: Chronic | ICD-10-CM

## 2022-02-15 DIAGNOSIS — C79.51 PAIN FROM BONE METASTASES: Primary | ICD-10-CM

## 2022-02-15 DIAGNOSIS — G89.3 PAIN FROM BONE METASTASES: Primary | ICD-10-CM

## 2022-02-15 PROCEDURE — A9606 RADIUM RA223 DICHLORIDE THER: HCPCS | Performed by: RADIOLOGY

## 2022-02-15 PROCEDURE — G0463 HOSPITAL OUTPT CLINIC VISIT: HCPCS

## 2022-02-15 PROCEDURE — 77336 RADIATION PHYSICS CONSULT: CPT | Performed by: RADIOLOGY

## 2022-02-15 PROCEDURE — 79101 NUCLEAR RX IV ADMIN: CPT | Performed by: RADIOLOGY

## 2022-02-15 PROCEDURE — 77300 RADIATION THERAPY DOSE PLAN: CPT | Performed by: RADIOLOGY

## 2022-02-15 NOTE — PROGRESS NOTES
RADIATION ONCOLOGY COMPLETION NOTE  INFUSION OF RADIONUCLIDE RADIUM-223    PATIENT:                                                      Dusty Manzanares  MEDICAL RECORD #:                        [id number]  :                                                          1945  DATE OF PROCEDURE:                       2/15/2022   DIAGNOSIS:     Bone Metastases  STAGE:     IV (M1)    PROCEDURE:  Injection of Radium-223 dichloride (Xofigo) 6 of 6 planned.    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  Radionuclide therapy is indicated for palliation.  Current pain 0/10.  He is feeling stronger since placement of Pacer/Defibrillator.    Recent visit with Dr Gavin they discussed his rising PSA in spite of reduced pain and normalization of Alk Phos.  Additional systemic treatment or Harmony-177 PSMA could be considered once available.  The inherent complexity of this case, based on multi-modality coordination of treatment and the interdisciplinary coordination of care for this patient justifies a special treatment procedure.  Informed consent was obtained and is documented in the chart.    LABORATORY:  Pre-injection CBC from 2022 showed adequate values for radionuclide therapy.  Lab Results   Component Value Date    WBC 5.5 04/15/2018    HGB 12.6 04/15/2018    HCT 39.9 04/15/2018    MCV 92 04/15/2018     04/15/2018     .47 ng/ml    Lab Results   Component Value Date    ALKPHOS 62 04/15/2018         PROCEDURE DESCRIPTION:  Radium-223 (Xofigo) was prescribed at 1.49 µCi/Kg.  We received a dose of 108.9 µCi in 5.8 mL.  Special dosimetry assay performed at [facility] confirmed 104.3 µCi dosing.  After adequate hydration, the entire volume was manually infused through slow intravenous push over 2 minutes with subsequent IV hydration to flush the line.  Postinfusion assay showed residual 2 µCi  resulting in total infusion activity 102.3 µCi.    COMPLICATIONS:  None.  The patient tolerated the procedure well with no apparent side effects.    DISPOSITION:   Our physicist surveyed the room and no residual radioactivity was present.  All supplies were disposed of appropriately.  Patient was discharged to home with radiation precautions and instructions.  Follow-up will be with repeat CBC in 3 weeks and follow up in approximately 4 weeks.

## 2022-03-15 ENCOUNTER — HOSPITAL ENCOUNTER (OUTPATIENT)
Dept: RADIATION ONCOLOGY | Facility: HOSPITAL | Age: 77
Setting detail: RADIATION/ONCOLOGY SERIES
Discharge: HOME | End: 2022-03-15

## 2022-03-16 ENCOUNTER — OFFICE VISIT (OUTPATIENT)
Dept: RADIATION ONCOLOGY | Facility: HOSPITAL | Age: 77
End: 2022-03-16

## 2022-03-16 DIAGNOSIS — C79.51 PAIN FROM BONE METASTASES: Primary | ICD-10-CM

## 2022-03-16 DIAGNOSIS — G89.3 PAIN FROM BONE METASTASES: Primary | ICD-10-CM

## 2022-03-16 NOTE — PROGRESS NOTES
FOLLOW UP NOTE    PATIENT:                                                      Dusty Manzanares  MEDICAL RECORD #:                        [id number]  :                                                          1945  COMPLETION DATE:   2/15/2022  DIAGNOSIS:     Prostate cancer metastatic to bone  - Stage IV    This visit has been rescheduled as a phone visit to comply with patient safety concerns in accordance with CDC recommendations in light of the COVID-19 pandemic.  Total time of discussion was 10 minutes.  Verbal consent given.    COVID-19 RISK SCREEN     1. Has the patient had close contact or exposure without PPE with a lab confirmed COVID-19 (+) person or a person under investigation (PUI) for COVID-19 infection?  -- No     2. Has the patient had respiratory symptoms, worsened/new cough and/or SOA, unexplained fever, or sudden loss of smell and/or taste in the past week? --  No    3. Has the patient completed COVID vaccination? --  Unknown         BRIEF HISTORY:    Initial follow-up visit via telemedicine.  Dusty Manzanares has castrate resistant metastatic prostate cancer with radiographic evidence of osseous metastatic disease and no organ involvement.  The patient has been on multiple systemic treatments, and more recently declined any additional chemotherapy.    He was restarted on androgen deprivation therapy with Eligard injections.  He underwent palliative radionucleotide therapy with 6 monthly injections of radium-223 (Xofigo), which he completed 2/15/2022.  Labs remained sufficient for treatment.    PSA, however, is rising with most recent level of 242 ng/ml on 2022.  He reports excellent clinical response, citing complete resolution of pain and no new sites of pain.  He remains active and independent with ADLs.  The patient did experience a reported episode of cardiac arrest unrelated to treatment, prompting placement of pacer/defibrillator.  He reports doing well since that time.  Weight is  steady.  No additional complaints at this time.  Of note, he has a history of multiple skin cancers and underwent a series of scalp irradiation July 2018 at [facility] with good local control of that site.        MEDICATIONS: Medication reconciliation for the patient was reviewed and confirmed in the electronic medical record.    Review of Systems   Respiratory: Positive for cough and shortness of breath (with activity).    All other systems reviewed and are negative.    KPS 80%          Physical Exam  Pulmonary:      Respirations even, unlabored. No audible wheezing or cough.  Neurological:      A+Ox4, conversant, answers questions appropriately.  Psychiatric:     Judgement, affect, and decision-making WNL.    Limited physical exam as visit was conducted remotely via telephone in light of the COVID-19 pandemic.      The following portions of the patient's history were reviewed and updated as appropriate: allergies, current medications, past family history, past medical history, past social history, past surgical history and problem list.         Diagnoses and all orders for this visit:    1. Pain from bone metastases (HCC) (Primary)         IMPRESSION:  Metastatic high-grade prostate cancer, 11 years after initial diagnosis and treatment with pelvic irradiation and androgen suppression.  He has progressed through multiple treatment regimens with variable success and recent rising PSA.  Patient was restarted on intermittent androgen ablation with reinitiation of LHRH agonist through his urologist.  He presented with bone metastases without significant associated pain.    He completed 6 monthly injections of radionucleotide therapy with Xofigo to prevent progressive bone pain or fractures.  He tolerated treatment well.  He had improvement/normalization of alkaline phosphatase levels, indicating healing of bone metastases.  PSA unfortunately continues to increase.    He would still be a candidate for subsequent systemic  chemotherapy; however, patient would like to use cytotoxic treatment as a last resort.    The patient is scheduled to see Dr. Gavin next week for discussion of potential clinical trial for which he may be eligible given his molecular study results.  Patient may also be a candidate for additional palliative conventional radiation delivered to symptomatic sites of disease refractory to radionuclear therapy.  Harmony-177 PSMA could also be considered once it becomes available.    Previously treated skin cancer of scalp, and extended remission status.    RECOMMENDATIONS: Dusty Manzanares continues urologic care and androgen deprivation therapy with Dr. Leonidas Davila.  He is scheduled for routine follow-up with his medical oncologist, Dr. Gavin, next week.  At this point, we will be happy to see him as needed should palliative focused radiotherapy be indicated based on symptomology/imaging.      I spent a total of 20 minutes on today's visit, with more than 10 minutes in virtual communication with the patient via telephone, and the remainder of the time spent in reviewing the relevant history, records, available imaging, and for documentation.    Return if symptoms worsen or fail to improve, for Office Visit.    Cesar Mckeon, APRN

## 2022-11-04 ENCOUNTER — TELEPHONE (OUTPATIENT)
Dept: RADIATION ONCOLOGY | Facility: HOSPITAL | Age: 77
End: 2022-11-04

## 2022-11-04 NOTE — TELEPHONE ENCOUNTER
Pt's wife called asking if we can give the pt lutetium 177.  Discussed with Dr. Salazar and called wife back.  I explained we will not be able to give the med for another 6-8 months but Dr. Fransisco Mullins @  is giving it now.  Wife verbalized understanding.